# Patient Record
Sex: MALE | Race: WHITE | NOT HISPANIC OR LATINO | Employment: FULL TIME | URBAN - METROPOLITAN AREA
[De-identification: names, ages, dates, MRNs, and addresses within clinical notes are randomized per-mention and may not be internally consistent; named-entity substitution may affect disease eponyms.]

---

## 2019-10-16 ENCOUNTER — OFFICE VISIT (OUTPATIENT)
Dept: URGENT CARE | Facility: CLINIC | Age: 44
End: 2019-10-16
Payer: COMMERCIAL

## 2019-10-16 VITALS
BODY MASS INDEX: 27 KG/M2 | TEMPERATURE: 98 F | DIASTOLIC BLOOD PRESSURE: 80 MMHG | OXYGEN SATURATION: 99 % | HEART RATE: 91 BPM | WEIGHT: 168 LBS | RESPIRATION RATE: 18 BRPM | SYSTOLIC BLOOD PRESSURE: 123 MMHG | HEIGHT: 66 IN

## 2019-10-16 DIAGNOSIS — M54.50 LOW BACK PAIN, NON-SPECIFIC: ICD-10-CM

## 2019-10-16 DIAGNOSIS — S40.021A CONTUSION OF RIGHT UPPER EXTREMITY, INITIAL ENCOUNTER: ICD-10-CM

## 2019-10-16 DIAGNOSIS — S20.229A CONTUSION OF BACK, UNSPECIFIED LATERALITY, INITIAL ENCOUNTER: ICD-10-CM

## 2019-10-16 DIAGNOSIS — M79.601 RIGHT ARM PAIN: Primary | ICD-10-CM

## 2019-10-16 PROCEDURE — 99203 OFFICE O/P NEW LOW 30 MIN: CPT | Mod: S$GLB,,, | Performed by: NURSE PRACTITIONER

## 2019-10-16 PROCEDURE — 72100 XR LUMBAR SPINE 2 OR 3 VIEWS: ICD-10-PCS | Mod: FY,S$GLB,, | Performed by: RADIOLOGY

## 2019-10-16 PROCEDURE — 73090 XR FOREARM RIGHT: ICD-10-PCS | Mod: FY,RT,S$GLB, | Performed by: RADIOLOGY

## 2019-10-16 PROCEDURE — 3008F PR BODY MASS INDEX (BMI) DOCUMENTED: ICD-10-PCS | Mod: CPTII,S$GLB,, | Performed by: NURSE PRACTITIONER

## 2019-10-16 PROCEDURE — 3008F BODY MASS INDEX DOCD: CPT | Mod: CPTII,S$GLB,, | Performed by: NURSE PRACTITIONER

## 2019-10-16 PROCEDURE — 72100 X-RAY EXAM L-S SPINE 2/3 VWS: CPT | Mod: FY,S$GLB,, | Performed by: RADIOLOGY

## 2019-10-16 PROCEDURE — 73090 X-RAY EXAM OF FOREARM: CPT | Mod: FY,RT,S$GLB, | Performed by: RADIOLOGY

## 2019-10-16 PROCEDURE — 99203 PR OFFICE/OUTPT VISIT, NEW, LEVL III, 30-44 MIN: ICD-10-PCS | Mod: S$GLB,,, | Performed by: NURSE PRACTITIONER

## 2019-10-16 RX ORDER — MUPIROCIN 20 MG/G
OINTMENT TOPICAL
Status: COMPLETED | OUTPATIENT
Start: 2019-10-16 | End: 2019-10-16

## 2019-10-16 RX ORDER — LAMOTRIGINE 25 MG/1
25 TABLET ORAL DAILY
COMMUNITY
End: 2019-10-18 | Stop reason: SDUPTHER

## 2019-10-16 RX ADMIN — MUPIROCIN: 20 OINTMENT TOPICAL at 12:10

## 2019-10-16 NOTE — PATIENT INSTRUCTIONS
RETURN TO CLINIC IF SYMPTOMS WORSEN OR CALL 911 IMMEDIATELY FOR SHORTNESS OF BREATH, CHEST PAIN, DIZZINESS, WORSENING PAIN, NAUSEA AND VOMITING, HEART PALPITATIONS, FEVER AND/OR NECK STIFFNESS. FOLLOW UP WITH PRIMARY CARE PROVIDER IN THE AM.    If you were prescribed a narcotic or controlled medication, do not drive or operate heavy equipment or machinery while taking these medications.  You must understand that you've received an Urgent Care treatment only and that you may be released before all your medical problems are known or treated. You, the patient, will arrange for follow up care as instructed.  Follow up with your PCP or specialty clinic as directed in the next 1-2 weeks if not improved or as needed.  You can call [phone] to schedule an appointment with the appropriate provider.  If your condition worsens we recommend that you receive another evaluation at the emergency room immediately or contact your primary medical clinics after hours call service to discuss your concerns.  Please return here or go to the Emergency Department for any concerns or worsening of condition.

## 2019-10-16 NOTE — PROGRESS NOTES
"Subjective:       Patient ID: Pal Mccrary is a 44 y.o. male.    Vitals:  height is 5' 6" (1.676 m) and weight is 76.2 kg (168 lb). His temperature is 98.4 °F (36.9 °C). His blood pressure is 123/80 and his pulse is 91. His respiration is 18 and oxygen saturation is 99%.     Chief Complaint: Fall (last night)    Patient presents with complaint of fall that happened last night. He notes he was going down the stairs and he fell on his right side. He states he has pain from the right side of his neck to his fingers, his right ankle, and his lower back. He denies takign any pain medications.  No head trauma.  No loss of consciousness.  No nausea, vomiting, or headache.    Fall   The accident occurred 12 to 24 hours ago. Fall occurred: stairs. He fell from a height of 1 to 2 ft. He landed on hard floor. The point of impact was the buttocks. The pain is present in the back, right shoulder, right upper arm, right lower arm, right wrist, right hand and right foot. The pain is at a severity of 7/10. The pain is moderate. The symptoms are aggravated by ambulation and movement. Pertinent negatives include no fever, headaches, nausea or vomiting. He has tried nothing for the symptoms. The treatment provided no relief.       Constitution: Negative for chills, fatigue and fever.   HENT: Negative for congestion and sore throat.    Neck: Negative for painful lymph nodes.   Cardiovascular: Negative for chest pain and leg swelling.   Eyes: Negative for double vision and blurred vision.   Respiratory: Negative for cough and shortness of breath.    Gastrointestinal: Negative for nausea, vomiting and diarrhea.   Genitourinary: Negative for dysuria, frequency and urgency.   Musculoskeletal: Positive for pain, back pain and pain with walking. Negative for joint pain, joint swelling, muscle cramps and muscle ache.        Right side of neck down to hand, right ankle, and lower back    Skin: Negative for color change, pale and rash. "   Allergic/Immunologic: Negative for seasonal allergies.   Neurological: Negative for dizziness, history of vertigo, light-headedness, passing out and headaches.   Hematologic/Lymphatic: Negative for swollen lymph nodes, easy bruising/bleeding and history of blood clots. Does not bruise/bleed easily.   Psychiatric/Behavioral: Negative for nervous/anxious, sleep disturbance and depression. The patient is not nervous/anxious.        Objective:      Physical Exam   Constitutional: He is oriented to person, place, and time. He appears well-developed and well-nourished. He is cooperative.  Non-toxic appearance. He does not appear ill. No distress.   HENT:   Head: Normocephalic and atraumatic. Head is without abrasion, without contusion and without laceration.   Right Ear: Hearing, tympanic membrane, external ear and ear canal normal. No hemotympanum.   Left Ear: Hearing, tympanic membrane, external ear and ear canal normal. No hemotympanum.   Nose: Nose normal. No mucosal edema, rhinorrhea or nasal deformity. No epistaxis. Right sinus exhibits no maxillary sinus tenderness and no frontal sinus tenderness. Left sinus exhibits no maxillary sinus tenderness and no frontal sinus tenderness.   Mouth/Throat: Uvula is midline, oropharynx is clear and moist and mucous membranes are normal. No trismus in the jaw. Normal dentition. No uvula swelling. No posterior oropharyngeal erythema.   Eyes: Pupils are equal, round, and reactive to light. Conjunctivae, EOM and lids are normal. Right eye exhibits no discharge. Left eye exhibits no discharge. No scleral icterus.   Neck: Trachea normal, normal range of motion, full passive range of motion without pain and phonation normal. Neck supple. No spinous process tenderness and no muscular tenderness present. No neck rigidity. No tracheal deviation present.   Cardiovascular: Normal rate, regular rhythm, normal heart sounds, intact distal pulses and normal pulses.   Pulmonary/Chest: Effort  normal and breath sounds normal. No respiratory distress.   Abdominal: Soft. Normal appearance and bowel sounds are normal. He exhibits no distension, no pulsatile midline mass and no mass. There is no tenderness.   Musculoskeletal: Normal range of motion. He exhibits no edema or deformity.        Lumbar back: He exhibits tenderness, bony tenderness, swelling, pain and spasm.        Back:         Right forearm: He exhibits tenderness.        Arms:  Neurological: He is alert and oriented to person, place, and time. He has normal strength. No cranial nerve deficit or sensory deficit. He exhibits normal muscle tone. He displays no seizure activity. Coordination normal. GCS eye subscore is 4. GCS verbal subscore is 5. GCS motor subscore is 6.   Skin: Skin is warm, dry, intact, not diaphoretic and not pale. Capillary refill takes less than 2 seconds. abrasion, burn, bruising and ecchymosis  Psychiatric: He has a normal mood and affect. His speech is normal and behavior is normal. Judgment and thought content normal. Cognition and memory are normal.   Nursing note and vitals reviewed.        Assessment:       1. Right arm pain    2. Low back pain, non-specific    3. Contusion of back, unspecified laterality, initial encounter    4. Contusion of right upper extremity, initial encounter        Plan:         Right arm pain  -     SLING FOR HOME USE    Low back pain, non-specific  -     X-Ray Lumbar Spine 2 Or 3 Views; Future; Expected date: 10/16/2019  -     X-Ray Forearm Right; Future; Expected date: 10/16/2019    Contusion of back, unspecified laterality, initial encounter    Contusion of right upper extremity, initial encounter    Other orders  -     mupirocin 2 % ointment

## 2019-10-16 NOTE — LETTER
October 16, 2019      [facility]  [address] [address]  Phone: [phone]  Fax: [phone]       Patient: Pal Mccrary   YOB: 1975  Date of Visit: 10/16/2019    To Whom It May Concern:    Yamile Mccrary  was at [facility] on 10/16/2019. He may return to work/school on 10/17/2019 with minimal restrictions. No heavy lifting or hard labor. Patient is to take pain medication when needed. If you have any questions or concerns, or if I can be of further assistance, please do not hesitate to contact me.      Sincerely,          Sofya Donis , VENTURAC

## 2019-10-18 ENCOUNTER — IMMUNIZATION (OUTPATIENT)
Dept: PHARMACY | Facility: CLINIC | Age: 44
End: 2019-10-18
Payer: COMMERCIAL

## 2019-10-18 ENCOUNTER — OFFICE VISIT (OUTPATIENT)
Dept: INTERNAL MEDICINE | Facility: CLINIC | Age: 44
End: 2019-10-18
Payer: COMMERCIAL

## 2019-10-18 ENCOUNTER — LAB VISIT (OUTPATIENT)
Dept: LAB | Facility: HOSPITAL | Age: 44
End: 2019-10-18
Payer: COMMERCIAL

## 2019-10-18 VITALS
DIASTOLIC BLOOD PRESSURE: 60 MMHG | HEIGHT: 66 IN | OXYGEN SATURATION: 99 % | HEART RATE: 76 BPM | BODY MASS INDEX: 28.6 KG/M2 | WEIGHT: 177.94 LBS | SYSTOLIC BLOOD PRESSURE: 120 MMHG

## 2019-10-18 DIAGNOSIS — Z00.00 HEALTHCARE MAINTENANCE: Primary | ICD-10-CM

## 2019-10-18 DIAGNOSIS — Z00.00 HEALTHCARE MAINTENANCE: ICD-10-CM

## 2019-10-18 DIAGNOSIS — G40.909 NONINTRACTABLE EPILEPSY WITHOUT STATUS EPILEPTICUS, UNSPECIFIED EPILEPSY TYPE: ICD-10-CM

## 2019-10-18 DIAGNOSIS — S39.012D STRAIN OF MUSCLE, FASCIA AND TENDON OF LOWER BACK, SUBSEQUENT ENCOUNTER: ICD-10-CM

## 2019-10-18 LAB
ALBUMIN SERPL BCP-MCNC: 4.2 G/DL (ref 3.5–5.2)
ALP SERPL-CCNC: 58 U/L (ref 55–135)
ALT SERPL W/O P-5'-P-CCNC: 31 U/L (ref 10–44)
ANION GAP SERPL CALC-SCNC: 9 MMOL/L (ref 8–16)
AST SERPL-CCNC: 18 U/L (ref 10–40)
BASOPHILS # BLD AUTO: 0.02 K/UL (ref 0–0.2)
BASOPHILS NFR BLD: 0.3 % (ref 0–1.9)
BILIRUB SERPL-MCNC: 0.3 MG/DL (ref 0.1–1)
BUN SERPL-MCNC: 10 MG/DL (ref 6–20)
CALCIUM SERPL-MCNC: 8.7 MG/DL (ref 8.7–10.5)
CHLORIDE SERPL-SCNC: 106 MMOL/L (ref 95–110)
CHOLEST SERPL-MCNC: 207 MG/DL (ref 120–199)
CHOLEST/HDLC SERPL: 4.1 {RATIO} (ref 2–5)
CO2 SERPL-SCNC: 25 MMOL/L (ref 23–29)
CREAT SERPL-MCNC: 0.8 MG/DL (ref 0.5–1.4)
DIFFERENTIAL METHOD: NORMAL
EOSINOPHIL # BLD AUTO: 0.2 K/UL (ref 0–0.5)
EOSINOPHIL NFR BLD: 3 % (ref 0–8)
ERYTHROCYTE [DISTWIDTH] IN BLOOD BY AUTOMATED COUNT: 13.1 % (ref 11.5–14.5)
EST. GFR  (AFRICAN AMERICAN): >60 ML/MIN/1.73 M^2
EST. GFR  (NON AFRICAN AMERICAN): >60 ML/MIN/1.73 M^2
ESTIMATED AVG GLUCOSE: 108 MG/DL (ref 68–131)
GLUCOSE SERPL-MCNC: 100 MG/DL (ref 70–110)
HBA1C MFR BLD HPLC: 5.4 % (ref 4–5.6)
HCT VFR BLD AUTO: 44.8 % (ref 40–54)
HDLC SERPL-MCNC: 50 MG/DL (ref 40–75)
HDLC SERPL: 24.2 % (ref 20–50)
HGB BLD-MCNC: 14.9 G/DL (ref 14–18)
LDLC SERPL CALC-MCNC: 135.4 MG/DL (ref 63–159)
LYMPHOCYTES # BLD AUTO: 2 K/UL (ref 1–4.8)
LYMPHOCYTES NFR BLD: 30.2 % (ref 18–48)
MCH RBC QN AUTO: 29.9 PG (ref 27–31)
MCHC RBC AUTO-ENTMCNC: 33.3 G/DL (ref 32–36)
MCV RBC AUTO: 90 FL (ref 82–98)
MONOCYTES # BLD AUTO: 0.5 K/UL (ref 0.3–1)
MONOCYTES NFR BLD: 8.2 % (ref 4–15)
NEUTROPHILS # BLD AUTO: 3.8 K/UL (ref 1.8–7.7)
NEUTROPHILS NFR BLD: 58.3 % (ref 38–73)
NONHDLC SERPL-MCNC: 157 MG/DL
PLATELET # BLD AUTO: 229 K/UL (ref 150–350)
PMV BLD AUTO: 10.3 FL (ref 9.2–12.9)
POTASSIUM SERPL-SCNC: 4.2 MMOL/L (ref 3.5–5.1)
PROT SERPL-MCNC: 6.7 G/DL (ref 6–8.4)
RBC # BLD AUTO: 4.98 M/UL (ref 4.6–6.2)
SODIUM SERPL-SCNC: 140 MMOL/L (ref 136–145)
TRIGL SERPL-MCNC: 108 MG/DL (ref 30–150)
TSH SERPL DL<=0.005 MIU/L-ACNC: 1.39 UIU/ML (ref 0.4–4)
WBC # BLD AUTO: 6.59 K/UL (ref 3.9–12.7)

## 2019-10-18 PROCEDURE — 80053 COMPREHEN METABOLIC PANEL: CPT

## 2019-10-18 PROCEDURE — 36415 COLL VENOUS BLD VENIPUNCTURE: CPT

## 2019-10-18 PROCEDURE — 99396 PR PREVENTIVE VISIT,EST,40-64: ICD-10-PCS | Mod: S$GLB,,, | Performed by: STUDENT IN AN ORGANIZED HEALTH CARE EDUCATION/TRAINING PROGRAM

## 2019-10-18 PROCEDURE — 85025 COMPLETE CBC W/AUTO DIFF WBC: CPT

## 2019-10-18 PROCEDURE — 99999 PR PBB SHADOW E&M-EST. PATIENT-LVL III: CPT | Mod: PBBFAC,,, | Performed by: STUDENT IN AN ORGANIZED HEALTH CARE EDUCATION/TRAINING PROGRAM

## 2019-10-18 PROCEDURE — 80061 LIPID PANEL: CPT

## 2019-10-18 PROCEDURE — 99999 PR PBB SHADOW E&M-EST. PATIENT-LVL III: ICD-10-PCS | Mod: PBBFAC,,, | Performed by: STUDENT IN AN ORGANIZED HEALTH CARE EDUCATION/TRAINING PROGRAM

## 2019-10-18 PROCEDURE — 83036 HEMOGLOBIN GLYCOSYLATED A1C: CPT

## 2019-10-18 PROCEDURE — 99396 PREV VISIT EST AGE 40-64: CPT | Mod: S$GLB,,, | Performed by: STUDENT IN AN ORGANIZED HEALTH CARE EDUCATION/TRAINING PROGRAM

## 2019-10-18 PROCEDURE — 84443 ASSAY THYROID STIM HORMONE: CPT

## 2019-10-18 RX ORDER — LAMOTRIGINE 25 MG/1
25 TABLET ORAL DAILY
Qty: 30 TABLET | Refills: 3 | Status: SHIPPED | OUTPATIENT
Start: 2019-10-18 | End: 2019-11-05

## 2019-10-18 NOTE — PROGRESS NOTES
Reviewed patient's medical record in Epic. Patient's history and physical reviewed and discussed, please refer to resident physician's note for specific details. I agree with resident's assessment and plan.    45 y/o man with h/o epilepsy with no other PMH here for health maintenance / establish care visit following recent  visit for fall. Epilepsy well-controlled, continuing current medication until he can get in to see Neurology.  Healing well after fall with back muscle strain, contusion to arm causing ulnar neuropathy which is improving consistently.    rFench Hess MD

## 2019-10-18 NOTE — PROGRESS NOTES
INTERNAL MEDICINE RESIDENT CLINIC                                                             CLINIC NOTE    Patient Name: Pal Mccrary  YOB: 1975    PRESENTING HISTORY     Chief Complaint   Patient presents with    Establish Care       History of Present Illness:  Mr. Pal Mccrary is a 44 y.o. male w/ significant PMHx of epilepsy.    Pt presents to resident clinic for follow-up of his urgent care visit, review of epilepsy and healthcare maintenance.     Two days ago, he visited urgent care following a fall. He was walking down a tight metal spiral staircase that was outdoors with his dog on a leash. It had rained the night before so the steps were damp. His dog tugged at the leash in awkwardly and thus caused him to become off-balance. He denies lightheadedness, loss of consciousness, pre-syncope or altered sensorium prior to the fall. Most of the trauma from the fall affected the right side of his back and right arm. Following the fall, he experienced bruising along his right arm and back as well as swelling around his right arm. Right after the fall, he also experienced tingling and numbness in his right pinky and ring fingers. This numbness and tingling has improved with the swelling. He has only required tylenol twice now for pain and feels the pain is managed without OTC analgesics at this point.     Epilepsy  Diagnosed two years ago. Well-controlled on his current regimen. Would like to establish care with a neurologist.   Lamotrigine, 25 mg qd    Healthcare maintenance    Tobacco: 36 pack year history. Quit cigarettes two years ago. Switched to E-cigarettes.   EtOH: Four beers every Sunday  Recreational: Last used cannabis two years ago. Stopped after his diagnosis of epilepsy.  Sexually active with wife. No birth control currently.      Review of Systems   Constitutional: Negative for chills, fever, malaise/fatigue and weight loss.   HENT: Negative for  congestion, ear discharge and sore throat.    Eyes: Negative for blurred vision and redness.   Respiratory: Negative for cough, shortness of breath and wheezing.    Cardiovascular: Negative for chest pain, palpitations and leg swelling.   Gastrointestinal: Negative for abdominal pain, constipation, diarrhea, nausea and vomiting.   Genitourinary: Negative for dysuria and frequency.   Musculoskeletal: Positive for myalgias. Negative for joint pain.   Skin: Negative for itching and rash.   Neurological: Negative for dizziness, seizures, loss of consciousness and headaches.   Endo/Heme/Allergies: Negative for environmental allergies. Does not bruise/bleed easily.   Psychiatric/Behavioral: Negative for depression. The patient is not nervous/anxious and does not have insomnia.          PAST HISTORY:     Past Medical History:   Diagnosis Date    Seizures        No past surgical history on file.    No family history on file.    Social History     Socioeconomic History    Marital status:      Spouse name: Not on file    Number of children: Not on file    Years of education: Not on file    Highest education level: Not on file   Occupational History    Not on file   Social Needs    Financial resource strain: Not on file    Food insecurity:     Worry: Not on file     Inability: Not on file    Transportation needs:     Medical: Not on file     Non-medical: Not on file   Tobacco Use    Smoking status: Former Smoker    Smokeless tobacco: Current User   Substance and Sexual Activity    Alcohol use: Yes    Drug use: Never    Sexual activity: Not on file   Lifestyle    Physical activity:     Days per week: Not on file     Minutes per session: Not on file    Stress: Not on file   Relationships    Social connections:     Talks on phone: Not on file     Gets together: Not on file     Attends Quaker service: Not on file     Active member of club or organization: Not on file     Attends meetings of clubs or  organizations: Not on file     Relationship status: Not on file   Other Topics Concern    Not on file   Social History Narrative    Not on file       MEDICATIONS & ALLERGIES:               Medication List with Changes/Refills   Current Medications    LAMOTRIGINE (LAMICTAL) 25 MG TABLET    Take 25 mg by mouth once daily.       Review of patient's allergies indicates:   Allergen Reactions    Ansaid [flurbiprofen]     Shellfish containing products        OBJECTIVE:     Vital Signs:  There were no vitals filed for this visit.  Wt Readings from Last 5 Encounters:   10/16/19 76.2 kg (168 lb)       No results found for this or any previous visit (from the past 24 hour(s)).      Physical Exam      ASSESSMENT & PLAN:     Pal was seen today for establish care. Advised him to include ice packs as part of the regimen for his fall.      Diagnoses and all orders for this visit:    Healthcare maintenance  -     Lipid panel; Future  -     Hemoglobin A1c; Future    Nonintractable epilepsy without status epilepticus, unspecified epilepsy type  -     Ambulatory Referral to Neurology  -     lamoTRIgine (LAMICTAL) 25 MG tablet; Take 1 tablet (25 mg total) by mouth once daily.  -     Comprehensive metabolic panel; Future  -     CBC auto differential; Future  -     TSH; Future    Strain of muscle, fascia and tendon of lower back, subsequent encounter        AT NEXT VISIT  Review visit with neurology.    RTC in 6 months. Pt was instructed to contact the clinic if symptoms worsened or if new symptoms arise.    I have discussed the plan with Dr Hess.    Behram Khan, MD  Internal Medicine PGY-3  M: [phone]   #[phone]

## 2019-10-18 NOTE — LETTER
October 18, 2019      [facility]  [address]  [facility] [address]  Phone: [phone]  Fax: [phone]       Patient: Pal Mccrary   YOB: 1975  Date of Visit: 10/18/2019    To Whom It May Concern:    Yamile Mccrary  was at [facility] on 10/18/2019. He may return to work/school on Monday, 10/21 with no restrictions. If you have any questions or concerns, or if I can be of further assistance, please do not hesitate to contact me.    Sincerely,    Behram Khan, MD

## 2019-10-29 ENCOUNTER — PATIENT MESSAGE (OUTPATIENT)
Dept: INTERNAL MEDICINE | Facility: CLINIC | Age: 44
End: 2019-10-29

## 2019-10-29 ENCOUNTER — TELEPHONE (OUTPATIENT)
Dept: INTERNAL MEDICINE | Facility: CLINIC | Age: 44
End: 2019-10-29

## 2019-10-29 NOTE — TELEPHONE ENCOUNTER
----- Message from Josette Benavides sent at 10/29/2019  8:51 AM CDT -----  Contact: self   Patient would like to get test results  Name of test (lab, mammo, etc.):  NON FASTING LAB    Date of test:  10/18  Ordering provider: KHAN, BEHRAM  Where was the test performed:  [facility] INTERNAL MED  Would the patient rather a call back or a response via [facility]?:  Call back  Comments:

## 2019-11-04 ENCOUNTER — PATIENT MESSAGE (OUTPATIENT)
Dept: INTERNAL MEDICINE | Facility: CLINIC | Age: 44
End: 2019-11-04

## 2019-11-05 ENCOUNTER — PATIENT MESSAGE (OUTPATIENT)
Dept: INTERNAL MEDICINE | Facility: CLINIC | Age: 44
End: 2019-11-05

## 2019-11-05 DIAGNOSIS — G40.909 NONINTRACTABLE EPILEPSY WITHOUT STATUS EPILEPTICUS, UNSPECIFIED EPILEPSY TYPE: ICD-10-CM

## 2019-11-05 DIAGNOSIS — G40.909 NONINTRACTABLE EPILEPSY WITHOUT STATUS EPILEPTICUS, UNSPECIFIED EPILEPSY TYPE: Primary | ICD-10-CM

## 2019-11-05 RX ORDER — LAMOTRIGINE 100 MG/1
300 TABLET ORAL DAILY
Qty: 90 TABLET | Refills: 1 | Status: SHIPPED | OUTPATIENT
Start: 2019-11-05 | End: 2019-11-05 | Stop reason: SDUPTHER

## 2019-11-05 RX ORDER — LAMOTRIGINE 100 MG/1
300 TABLET ORAL DAILY
Qty: 90 TABLET | Refills: 1 | Status: SHIPPED | OUTPATIENT
Start: 2019-11-05 | End: 2019-11-14 | Stop reason: SDUPTHER

## 2019-11-05 NOTE — TELEPHONE ENCOUNTER
Patient message from 10/29/19 notes that his current dose is 100mg x 3 (300mg total) taken once daily, which is a more usual dose for management of seizures. Needs bridge prescription to cover until neurology appt 12/12.

## 2019-11-13 ENCOUNTER — PATIENT MESSAGE (OUTPATIENT)
Dept: INTERNAL MEDICINE | Facility: CLINIC | Age: 44
End: 2019-11-13

## 2019-11-13 DIAGNOSIS — G40.909 NONINTRACTABLE EPILEPSY WITHOUT STATUS EPILEPTICUS, UNSPECIFIED EPILEPSY TYPE: ICD-10-CM

## 2019-11-18 RX ORDER — LAMOTRIGINE 100 MG/1
300 TABLET ORAL DAILY
Qty: 90 TABLET | Refills: 1 | Status: SHIPPED | OUTPATIENT
Start: 2019-11-18 | End: 2019-12-12 | Stop reason: SDUPTHER

## 2019-12-12 ENCOUNTER — OFFICE VISIT (OUTPATIENT)
Dept: NEUROLOGY | Facility: CLINIC | Age: 44
End: 2019-12-12
Payer: COMMERCIAL

## 2019-12-12 VITALS
SYSTOLIC BLOOD PRESSURE: 135 MMHG | HEIGHT: 66 IN | BODY MASS INDEX: 28.72 KG/M2 | DIASTOLIC BLOOD PRESSURE: 93 MMHG | HEART RATE: 84 BPM

## 2019-12-12 DIAGNOSIS — R56.9 SEIZURE: ICD-10-CM

## 2019-12-12 DIAGNOSIS — G40.209 COMPLEX PARTIAL SEIZURES EVOLVING TO GENERALIZED TONIC-CLONIC SEIZURES: Primary | ICD-10-CM

## 2019-12-12 PROCEDURE — 99205 PR OFFICE/OUTPT VISIT, NEW, LEVL V, 60-74 MIN: ICD-10-PCS | Mod: S$GLB,,, | Performed by: PSYCHIATRY & NEUROLOGY

## 2019-12-12 PROCEDURE — 99999 PR PBB SHADOW E&M-EST. PATIENT-LVL II: ICD-10-PCS | Mod: PBBFAC,,, | Performed by: PSYCHIATRY & NEUROLOGY

## 2019-12-12 PROCEDURE — 99205 OFFICE O/P NEW HI 60 MIN: CPT | Mod: S$GLB,,, | Performed by: PSYCHIATRY & NEUROLOGY

## 2019-12-12 PROCEDURE — 3008F BODY MASS INDEX DOCD: CPT | Mod: CPTII,S$GLB,, | Performed by: PSYCHIATRY & NEUROLOGY

## 2019-12-12 PROCEDURE — 3008F PR BODY MASS INDEX (BMI) DOCUMENTED: ICD-10-PCS | Mod: CPTII,S$GLB,, | Performed by: PSYCHIATRY & NEUROLOGY

## 2019-12-12 PROCEDURE — 99999 PR PBB SHADOW E&M-EST. PATIENT-LVL II: CPT | Mod: PBBFAC,,, | Performed by: PSYCHIATRY & NEUROLOGY

## 2019-12-12 RX ORDER — LAMOTRIGINE 100 MG/1
300 TABLET ORAL DAILY
Qty: 90 TABLET | Refills: 3 | Status: SHIPPED | OUTPATIENT
Start: 2019-12-12 | End: 2020-04-22 | Stop reason: SDUPTHER

## 2019-12-12 NOTE — PROGRESS NOTES
EPILEPSY CLINIC:   INITIAL VISIT    Name: Pal Mccrary  MRN:[id number]   CSN: [id number]  Date of service: 12/12/2019    Age:44 y.o.   Gender:male     Referring Physician/Service: Behram Khan, MD  [address] [place] [address]   The patient is here today alone  History obtained from patient    CHIEF COMPLAINT:   - evaluation and management of seizures    PRESENT ILLNESS:    This is a 44 y.o. right handed male who presents for evaluation and management of seizures.    - onset of seizures x infancy  - patient only recalls them from ~12-13 years of age: occurring 1-2 yearly    Younger years: loss of time and soreness of body, waking up on the floor (prior recollection was of laying in bed and watching tv); no hx of tongue bite of b/b incontinence  Has hx of associated injuries (bruises etc)    - not diagnosed with seizures until age ~32years  - was with friends, has no recollection of the event. But reportedly had a GTC sz and was seen by FP and Neurologist subsequently and then diagnosed with sz.  - had a few sz during that period and was evaluated and started on AED.    No aura reported during younger age, able to experience an 'electric/battery/ozone' smell/taste after age 32 years.   AED changed from LEV to LTG due to behavioral side-effects ~ few years ago.  - last sz was ~ 1 year ago: 'worst' per pt - was reportedly standing and talking when he just dropped to the ground (felt himself 'fading'); was confused for a prolonged (>6 hours) period of time when he came around.    - not good AED compliance      - also reports occasional/regular 'random twitches' - all his life; last episode was ~ 8 months ago    Any other relevant information:  - per patient his wife feels that while he was smoking marijuana seizures were better controlled    PREVIOUS EVALUATIONS:  No records available  Previous EEGs:   - last EEG done ~ 2 years ago, FORTUNATO Ugalde: normal (per patient)  - has had > 1 abnormal EEGs in the  past     Previous MRIs:   - last MRI was done ~4-5 years ago; FORTUNATO Ugalde: abnormal focus for FLE (per patient)    Additional tests:  1)CT Scan: no  2) EEG\Video Monitoring: no  3) PET Scan: no  4) Neuropsychological evaluation: no  5) DEXA Scan: no   6) Others: no    RISK FACTORS FOR SEIZURES:    1. Head Trauma:  Yes - falls from extreme sports as a child; hx of LOC but no hx of # skull    2. CNS Infections:  No  3. CNS Tumors: No     4. CNS Vascular Disease: No     5. Febrile Seizures: ??possibly - has hx of sz as an infant   6. Developmental Delay: No        7. Family History of Seizures: Yes  - paternal great GM, sister (febrile sz)   8. Birth history: oldest of 3 siblings; FTNVD    Pregnancy/Labor/Delivery: n/a    CURRENT MEDICATIONS:   Current Outpatient Medications   Medication Sig Dispense Refill    lamoTRIgine (LAMICTAL) 100 MG tablet Take 3 tablets (300 mg total) by mouth once daily. To prevent seizure 90 tablet 1     No current facility-administered medications for this visit.       Folic acid: no    CURRENT ANTI EPILEPTIC MEDICATIONS:   - LTG 300mg q day    VAGAL NERVE STIMULATOR: n/a    PRIOR ANTICONVULSANT HISTORY:   - tried LEV - behavioral issues     PAST MEDICAL HISTORY:   Active Ambulatory Problems     Diagnosis Date Noted    Nonintractable epilepsy without status epilepticus 10/18/2019     Resolved Ambulatory Problems     Diagnosis Date Noted    No Resolved Ambulatory Problems     Past Medical History:   Diagnosis Date    Epilepsy     Seizures       PAST PSYCHIATRIC HISTORY:  Longstanding anxiety and MDD - no tx; no hx of SI or HI    PAST SURGICAL HISTORY including Epilepsy surgery: No past surgical history on file.     FAMILY HISTORY:   Family History   Problem Relation Age of Onset    Heart disease Father        SOCIAL HISTORY:   Social History     Socioeconomic History    Marital status:      Spouse name: Not on file    Number of children: Not on file    Years of education:  Not on file    Highest education level: Not on file   Occupational History    Not on file   Social Needs    Financial resource strain: Not on file    Food insecurity:     Worry: Not on file     Inability: Not on file    Transportation needs:     Medical: Not on file     Non-medical: Not on file   Tobacco Use    Smoking status: Former Smoker     Packs/day: 1.50     Years: 24.00     Pack years: 36.00     Types: Vaping with nicotine    Smokeless tobacco: Current User   Substance and Sexual Activity    Alcohol use: Yes     Alcohol/week: 4.0 standard drinks     Types: 4 Cans of beer per week    Drug use: Never    Sexual activity: Yes     Partners: Female     Birth control/protection: None   Lifestyle    Physical activity:     Days per week: Not on file     Minutes per session: Not on file    Stress: Not on file   Relationships    Social connections:     Talks on phone: Not on file     Gets together: Not on file     Attends Episcopalian service: Not on file     Active member of club or organization: Not on file     Attends meetings of clubs or organizations: Not on file     Relationship status: Not on file   Other Topics Concern    Not on file   Social History Narrative    Not on file      a) Marital status:                                                     b) Living situation: patient lives with wife  c) Employed/Unemployed/Other: Employed full time - maintains apartments    DRIVING HISTORY:  Currently driving: Yes      LEVEL OF EDUCATION: some college    SUBSTANCE USE: occasional use of etoh - weekends, beers, gin/tonic; quit smoking ~ 3 years ago; occasional e-cig use; occasional use of marijuana    ALLERGIES: Ansaid [flurbiprofen] and Shellfish containing products     REVIEW OF SYSTEMS:  Review of Systems    GENERAL EXAMINATION:  There were no vitals taken for this visit.     General Appearance:    This is an average built male who appears well.  HEENT: There are no dysmorphic features  Skin:  There are no obvious stigmata for neurocutaneous disorders.  Neck: not assessed  Cardiovascular: not assessed  Lungs: not assessed  Abdomen: deferred  Spine: not assessed  Extremities: not assessed    NEUROLOGICAL EXAMINATION:  Mental status: Alert and oriented x 4; pleasant and cooperative with exam  Memory: Recent memory intact, Remote memory intact, Age correct, Month correct  Attention and concentration: intact  Fund of knowledge: adequate  Speech: normal  Dysarthria: No   Eyes: PERRL; EOM intact; No nystagmus. The visual pursuits  were smooth with normal saccadic eye movements.   Fundoscopic Exam: not assessed  No facial asymmetry. Facial sensation bilaterally not assessed  Hearing was intact bilaterally   Tongue and palate; SCM and trapezii bilaterally: not assessed     Motor examination:  Normal bulk and tone bilaterally. Power: no obvious deficits  Abnormal movements: none  Deep tendon reflexes: not assessed  Dysmetria: not assessed    Sensory examination:   - not assessed    Gait:  Normal gait and station    IMPRESSION:  The patient's history is consistent with:  Seizure  43yo M with hx of seizures possibly since infancy; diagnosed x age 12-13 yrs  - per patient has prior tests (EEG, MRI) suggestive of FLE (no records available)    Current AEDs: LTG 300mg q day - not very compliant      Plan:  - obtain prior records  - continue LTG 300mg q day; check levels  - MRI Brain  - Ext EEG > 1 hour    Seizure log  Seizure/fall precautions    Plan of care was discussed in detail with patient.     The patient was asked to call me/the clinic 1 week after the test(s) are done to obtain results.    More than 50% of the 60 minutes spent with the patient (as well as family/caregiver(s) was spent on face-to-face counseling about:    1. Diagnosis, plans, prognosis, medications and possible side-effects, risks and benefits of treatment, other alternatives to AEDs.  2. Risks related to continued seizures, status epilepticus,  SUDEP, driving restrictions and seizure precautions ( no baths but showers are ok, no swimming unsupervised, no use of heavy machinery, no use of sharp moving objects, avoid heights).   3. Issues related to pregnancy, OCP and breast feeding as it relates to epilepsy.  4. The potential of teratogenicity and suicidal risks of anti-epileptic medications.  5.Avoid any activity that compromise patient safety related to seizures.     Questions and concerns raised by the patient and family/care-giver(s) were addressed and they indicated understanding of everything discussed and agreed to plans as above.    Return after tests are done or earlier prn    Jannette Oconnell MD, LUNA(), FACNS, MARIBEL.  Neurology-Epilepsy.  [facility].

## 2019-12-12 NOTE — LETTER
December 15, 2019      Behram Khan, MD  [address]  [facility] [address]           [facility] - Neurology Epilepsy  [address], [address]  [facility] [address]  Phone: [phone]  Fax: [phone]          Patient: Pal Mccrary   MR Number: [id number]   YOB: 1975   Date of Visit: 12/12/2019       Dear Dr. Behram Khan:    Thank you for referring Pal Mccrary to me for evaluation. Attached you will find relevant portions of my assessment and plan of care.    If you have questions, please do not hesitate to call me. I look forward to following Pal Mccrary along with you.    Sincerely,    Jannette Oconnell MD    Enclosure  CC:  No Recipients    If you would like to receive this communication electronically, please contact [email or web address] or [phone] to request more information on [employer] Link access.    For providers and/or their staff who would like to refer a patient to [facility], please contact us through our one-stop-shop provider referral line, [facility], at [phone].    If you feel you have received this communication in error or would no longer like to receive these types of communications, please e-mail [email or web address]

## 2019-12-15 PROBLEM — R56.9 SEIZURE: Status: ACTIVE | Noted: 2019-12-15

## 2019-12-16 NOTE — ASSESSMENT & PLAN NOTE
45yo M with hx of seizures possibly since infancy; diagnosed x age 12-13 yrs  - per patient has prior tests (EEG, MRI) suggestive of FLE (no records available)    Current AEDs: LTG 300mg q day - not very compliant      Plan:  - obtain prior records  - continue LTG 300mg q day; check levels  - MRI Brain  - Ext EEG > 1 hour    Seizure log  Seizure/fall precautions    Plan of care was discussed in detail with patient.

## 2019-12-20 ENCOUNTER — PATIENT MESSAGE (OUTPATIENT)
Dept: NEUROLOGY | Facility: CLINIC | Age: 44
End: 2019-12-20

## 2020-04-22 ENCOUNTER — PATIENT MESSAGE (OUTPATIENT)
Dept: NEUROLOGY | Facility: CLINIC | Age: 45
End: 2020-04-22

## 2020-04-22 DIAGNOSIS — G40.209 COMPLEX PARTIAL SEIZURES EVOLVING TO GENERALIZED TONIC-CLONIC SEIZURES: ICD-10-CM

## 2020-04-22 RX ORDER — LAMOTRIGINE 100 MG/1
300 TABLET ORAL DAILY
Qty: 90 TABLET | Refills: 1 | Status: SHIPPED | OUTPATIENT
Start: 2020-04-22 | End: 2020-08-22 | Stop reason: SDUPTHER

## 2020-08-22 DIAGNOSIS — G40.209 COMPLEX PARTIAL SEIZURES EVOLVING TO GENERALIZED TONIC-CLONIC SEIZURES: ICD-10-CM

## 2020-08-24 RX ORDER — LAMOTRIGINE 100 MG/1
300 TABLET ORAL DAILY
Qty: 90 TABLET | Refills: 1 | Status: SHIPPED | OUTPATIENT
Start: 2020-08-24 | End: 2020-12-28 | Stop reason: SDUPTHER

## 2021-03-09 ENCOUNTER — LAB VISIT (OUTPATIENT)
Dept: INTERNAL MEDICINE | Facility: CLINIC | Age: 46
End: 2021-03-09
Payer: COMMERCIAL

## 2021-03-09 ENCOUNTER — TELEPHONE (OUTPATIENT)
Dept: INTERNAL MEDICINE | Facility: CLINIC | Age: 46
End: 2021-03-09

## 2021-03-09 ENCOUNTER — OFFICE VISIT (OUTPATIENT)
Dept: PULMONOLOGY | Facility: CLINIC | Age: 46
End: 2021-03-09
Payer: COMMERCIAL

## 2021-03-09 ENCOUNTER — PATIENT OUTREACH (OUTPATIENT)
Dept: ADMINISTRATIVE | Facility: OTHER | Age: 46
End: 2021-03-09

## 2021-03-09 ENCOUNTER — OFFICE VISIT (OUTPATIENT)
Dept: INTERNAL MEDICINE | Facility: CLINIC | Age: 46
End: 2021-03-09
Payer: COMMERCIAL

## 2021-03-09 ENCOUNTER — HOSPITAL ENCOUNTER (OUTPATIENT)
Dept: RADIOLOGY | Facility: HOSPITAL | Age: 46
Discharge: HOME | End: 2021-03-09
Attending: PHYSICIAN ASSISTANT
Payer: COMMERCIAL

## 2021-03-09 VITALS
WEIGHT: 156.75 LBS | HEIGHT: 66 IN | OXYGEN SATURATION: 99 % | BODY MASS INDEX: 25.19 KG/M2 | TEMPERATURE: 98 F | DIASTOLIC BLOOD PRESSURE: 64 MMHG | SYSTOLIC BLOOD PRESSURE: 102 MMHG | HEART RATE: 79 BPM

## 2021-03-09 VITALS
BODY MASS INDEX: 24.98 KG/M2 | DIASTOLIC BLOOD PRESSURE: 82 MMHG | SYSTOLIC BLOOD PRESSURE: 138 MMHG | HEIGHT: 66 IN | OXYGEN SATURATION: 97 % | HEART RATE: 101 BPM | WEIGHT: 155.44 LBS

## 2021-03-09 DIAGNOSIS — R04.2 HEMOPTYSIS: Primary | ICD-10-CM

## 2021-03-09 DIAGNOSIS — R04.2 HEMOPTYSIS: ICD-10-CM

## 2021-03-09 DIAGNOSIS — J20.9 ACUTE BRONCHITIS, UNSPECIFIED ORGANISM: ICD-10-CM

## 2021-03-09 DIAGNOSIS — R05.9 COUGH: Primary | ICD-10-CM

## 2021-03-09 DIAGNOSIS — R05.9 COUGH: ICD-10-CM

## 2021-03-09 DIAGNOSIS — G40.909 NONINTRACTABLE EPILEPSY WITHOUT STATUS EPILEPTICUS, UNSPECIFIED EPILEPSY TYPE: ICD-10-CM

## 2021-03-09 DIAGNOSIS — E87.5 HYPERKALEMIA: Primary | ICD-10-CM

## 2021-03-09 PROCEDURE — U0005 INFEC AGEN DETEC AMPLI PROBE: HCPCS | Performed by: PHYSICIAN ASSISTANT

## 2021-03-09 PROCEDURE — 99214 OFFICE O/P EST MOD 30 MIN: CPT | Mod: S$GLB,,, | Performed by: PHYSICIAN ASSISTANT

## 2021-03-09 PROCEDURE — 71046 X-RAY EXAM CHEST 2 VIEWS: CPT | Mod: 26,,, | Performed by: RADIOLOGY

## 2021-03-09 PROCEDURE — 99214 PR OFFICE/OUTPT VISIT, EST, LEVL IV, 30-39 MIN: ICD-10-PCS | Mod: S$GLB,,, | Performed by: PHYSICIAN ASSISTANT

## 2021-03-09 PROCEDURE — 99204 OFFICE O/P NEW MOD 45 MIN: CPT | Mod: S$GLB,,, | Performed by: INTERNAL MEDICINE

## 2021-03-09 PROCEDURE — 99999 PR PBB SHADOW E&M-EST. PATIENT-LVL III: ICD-10-PCS | Mod: PBBFAC,,, | Performed by: INTERNAL MEDICINE

## 2021-03-09 PROCEDURE — 1126F AMNT PAIN NOTED NONE PRSNT: CPT | Mod: S$GLB,,, | Performed by: INTERNAL MEDICINE

## 2021-03-09 PROCEDURE — 1126F PR PAIN SEVERITY QUANTIFIED, NO PAIN PRESENT: ICD-10-PCS | Mod: S$GLB,,, | Performed by: INTERNAL MEDICINE

## 2021-03-09 PROCEDURE — 99999 PR PBB SHADOW E&M-EST. PATIENT-LVL IV: ICD-10-PCS | Mod: PBBFAC,,, | Performed by: PHYSICIAN ASSISTANT

## 2021-03-09 PROCEDURE — 1126F PR PAIN SEVERITY QUANTIFIED, NO PAIN PRESENT: ICD-10-PCS | Mod: S$GLB,,, | Performed by: PHYSICIAN ASSISTANT

## 2021-03-09 PROCEDURE — 99204 PR OFFICE/OUTPT VISIT, NEW, LEVL IV, 45-59 MIN: ICD-10-PCS | Mod: S$GLB,,, | Performed by: INTERNAL MEDICINE

## 2021-03-09 PROCEDURE — U0003 INFECTIOUS AGENT DETECTION BY NUCLEIC ACID (DNA OR RNA); SEVERE ACUTE RESPIRATORY SYNDROME CORONAVIRUS 2 (SARS-COV-2) (CORONAVIRUS DISEASE [COVID-19]), AMPLIFIED PROBE TECHNIQUE, MAKING USE OF HIGH THROUGHPUT TECHNOLOGIES AS DESCRIBED BY CMS-2020-01-R: HCPCS | Performed by: PHYSICIAN ASSISTANT

## 2021-03-09 PROCEDURE — 1126F AMNT PAIN NOTED NONE PRSNT: CPT | Mod: S$GLB,,, | Performed by: PHYSICIAN ASSISTANT

## 2021-03-09 PROCEDURE — 71046 X-RAY EXAM CHEST 2 VIEWS: CPT | Mod: TC

## 2021-03-09 PROCEDURE — 3008F BODY MASS INDEX DOCD: CPT | Mod: CPTII,S$GLB,, | Performed by: INTERNAL MEDICINE

## 2021-03-09 PROCEDURE — 3008F PR BODY MASS INDEX (BMI) DOCUMENTED: ICD-10-PCS | Mod: CPTII,S$GLB,, | Performed by: PHYSICIAN ASSISTANT

## 2021-03-09 PROCEDURE — 71046 XR CHEST PA AND LATERAL: ICD-10-PCS | Mod: 26,,, | Performed by: RADIOLOGY

## 2021-03-09 PROCEDURE — 99999 PR PBB SHADOW E&M-EST. PATIENT-LVL III: CPT | Mod: PBBFAC,,, | Performed by: INTERNAL MEDICINE

## 2021-03-09 PROCEDURE — 99999 PR PBB SHADOW E&M-EST. PATIENT-LVL IV: CPT | Mod: PBBFAC,,, | Performed by: PHYSICIAN ASSISTANT

## 2021-03-09 PROCEDURE — 3008F PR BODY MASS INDEX (BMI) DOCUMENTED: ICD-10-PCS | Mod: CPTII,S$GLB,, | Performed by: INTERNAL MEDICINE

## 2021-03-09 PROCEDURE — 3008F BODY MASS INDEX DOCD: CPT | Mod: CPTII,S$GLB,, | Performed by: PHYSICIAN ASSISTANT

## 2021-03-09 RX ORDER — AZITHROMYCIN 250 MG/1
TABLET, FILM COATED ORAL
Qty: 6 TABLET | Refills: 0 | Status: SHIPPED | OUTPATIENT
Start: 2021-03-09 | End: 2021-03-14

## 2021-03-10 LAB — SARS-COV-2 RNA RESP QL NAA+PROBE: NOT DETECTED

## 2021-11-11 ENCOUNTER — OFFICE VISIT (OUTPATIENT)
Dept: URGENT CARE | Facility: CLINIC | Age: 46
End: 2021-11-11
Payer: COMMERCIAL

## 2021-11-11 VITALS
DIASTOLIC BLOOD PRESSURE: 75 MMHG | RESPIRATION RATE: 18 BRPM | HEIGHT: 66 IN | WEIGHT: 155 LBS | HEART RATE: 82 BPM | BODY MASS INDEX: 24.91 KG/M2 | SYSTOLIC BLOOD PRESSURE: 128 MMHG | OXYGEN SATURATION: 98 % | TEMPERATURE: 99 F

## 2021-11-11 DIAGNOSIS — S69.92XA HAND INJURY, LEFT, INITIAL ENCOUNTER: ICD-10-CM

## 2021-11-11 DIAGNOSIS — S60.222A CONTUSION OF LEFT HAND, INITIAL ENCOUNTER: Primary | ICD-10-CM

## 2021-11-11 PROCEDURE — 3008F PR BODY MASS INDEX (BMI) DOCUMENTED: ICD-10-PCS | Mod: CPTII,S$GLB,, | Performed by: PHYSICIAN ASSISTANT

## 2021-11-11 PROCEDURE — 3074F PR MOST RECENT SYSTOLIC BLOOD PRESSURE < 130 MM HG: ICD-10-PCS | Mod: CPTII,S$GLB,, | Performed by: PHYSICIAN ASSISTANT

## 2021-11-11 PROCEDURE — 3008F BODY MASS INDEX DOCD: CPT | Mod: CPTII,S$GLB,, | Performed by: PHYSICIAN ASSISTANT

## 2021-11-11 PROCEDURE — 73130 XR HAND COMPLETE 3 VIEW LEFT: ICD-10-PCS | Mod: FY,LT,S$GLB, | Performed by: RADIOLOGY

## 2021-11-11 PROCEDURE — 3074F SYST BP LT 130 MM HG: CPT | Mod: CPTII,S$GLB,, | Performed by: PHYSICIAN ASSISTANT

## 2021-11-11 PROCEDURE — 1159F PR MEDICATION LIST DOCUMENTED IN MEDICAL RECORD: ICD-10-PCS | Mod: CPTII,S$GLB,, | Performed by: PHYSICIAN ASSISTANT

## 2021-11-11 PROCEDURE — 3078F DIAST BP <80 MM HG: CPT | Mod: CPTII,S$GLB,, | Performed by: PHYSICIAN ASSISTANT

## 2021-11-11 PROCEDURE — 1159F MED LIST DOCD IN RCRD: CPT | Mod: CPTII,S$GLB,, | Performed by: PHYSICIAN ASSISTANT

## 2021-11-11 PROCEDURE — 99214 PR OFFICE/OUTPT VISIT, EST, LEVL IV, 30-39 MIN: ICD-10-PCS | Mod: S$GLB,,, | Performed by: PHYSICIAN ASSISTANT

## 2021-11-11 PROCEDURE — 73130 X-RAY EXAM OF HAND: CPT | Mod: FY,LT,S$GLB, | Performed by: RADIOLOGY

## 2021-11-11 PROCEDURE — 3078F PR MOST RECENT DIASTOLIC BLOOD PRESSURE < 80 MM HG: ICD-10-PCS | Mod: CPTII,S$GLB,, | Performed by: PHYSICIAN ASSISTANT

## 2021-11-11 PROCEDURE — 99214 OFFICE O/P EST MOD 30 MIN: CPT | Mod: S$GLB,,, | Performed by: PHYSICIAN ASSISTANT

## 2021-11-11 RX ORDER — ACETAMINOPHEN AND CODEINE PHOSPHATE 300; 30 MG/1; MG/1
TABLET ORAL
COMMUNITY
Start: 2021-05-30 | End: 2023-02-08

## 2021-11-11 RX ORDER — LACOSAMIDE 100 MG/1
100 TABLET ORAL
COMMUNITY
Start: 2021-10-12 | End: 2023-02-08

## 2021-11-11 RX ORDER — ZONISAMIDE 100 MG/1
100 CAPSULE ORAL 2 TIMES DAILY
COMMUNITY
Start: 2021-10-22 | End: 2023-04-17 | Stop reason: SDUPTHER

## 2021-12-08 ENCOUNTER — OFFICE VISIT (OUTPATIENT)
Dept: URGENT CARE | Facility: CLINIC | Age: 46
End: 2021-12-08
Payer: COMMERCIAL

## 2021-12-08 VITALS
DIASTOLIC BLOOD PRESSURE: 80 MMHG | HEIGHT: 66 IN | OXYGEN SATURATION: 98 % | WEIGHT: 164 LBS | BODY MASS INDEX: 26.36 KG/M2 | TEMPERATURE: 97 F | HEART RATE: 75 BPM | SYSTOLIC BLOOD PRESSURE: 133 MMHG

## 2021-12-08 DIAGNOSIS — K02.9 PAIN DUE TO DENTAL CARIES: Primary | ICD-10-CM

## 2021-12-08 DIAGNOSIS — K04.7 DENTAL INFECTION: ICD-10-CM

## 2021-12-08 PROCEDURE — 99214 OFFICE O/P EST MOD 30 MIN: CPT | Mod: S$GLB,,, | Performed by: PHYSICIAN ASSISTANT

## 2021-12-08 PROCEDURE — 99214 PR OFFICE/OUTPT VISIT, EST, LEVL IV, 30-39 MIN: ICD-10-PCS | Mod: S$GLB,,, | Performed by: PHYSICIAN ASSISTANT

## 2021-12-08 RX ORDER — AMOXICILLIN 500 MG/1
500 TABLET, FILM COATED ORAL EVERY 12 HOURS
Qty: 20 TABLET | Refills: 0 | Status: SHIPPED | OUTPATIENT
Start: 2021-12-08 | End: 2021-12-18

## 2023-02-08 ENCOUNTER — HOSPITAL ENCOUNTER (INPATIENT)
Facility: HOSPITAL | Age: 48
LOS: 2 days | Discharge: HOME | DRG: 101 | End: 2023-02-10
Attending: EMERGENCY MEDICINE | Admitting: STUDENT IN AN ORGANIZED HEALTH CARE EDUCATION/TRAINING PROGRAM
Payer: COMMERCIAL

## 2023-02-08 DIAGNOSIS — R07.9 CHEST PAIN: ICD-10-CM

## 2023-02-08 DIAGNOSIS — G40.909 SEIZURE DISORDER: Primary | ICD-10-CM

## 2023-02-08 DIAGNOSIS — G40.909 RECURRENT SEIZURES: ICD-10-CM

## 2023-02-08 DIAGNOSIS — G40.919 BREAKTHROUGH SEIZURE: ICD-10-CM

## 2023-02-08 PROBLEM — F41.9 ANXIETY: Status: ACTIVE | Noted: 2023-02-08

## 2023-02-08 LAB
ALBUMIN SERPL BCP-MCNC: 4.7 G/DL (ref 3.5–5.2)
ALP SERPL-CCNC: 89 U/L (ref 55–135)
ALT SERPL W/O P-5'-P-CCNC: 88 U/L (ref 10–44)
ANION GAP SERPL CALC-SCNC: 12 MMOL/L (ref 8–16)
AST SERPL-CCNC: 47 U/L (ref 10–40)
BASOPHILS # BLD AUTO: 0.05 K/UL (ref 0–0.2)
BASOPHILS NFR BLD: 0.7 % (ref 0–1.9)
BILIRUB SERPL-MCNC: 0.7 MG/DL (ref 0.1–1)
BILIRUB UR QL STRIP: NEGATIVE
BUN SERPL-MCNC: 11 MG/DL (ref 6–20)
CALCIUM SERPL-MCNC: 10 MG/DL (ref 8.7–10.5)
CHLORIDE SERPL-SCNC: 108 MMOL/L (ref 95–110)
CLARITY UR REFRACT.AUTO: CLEAR
CO2 SERPL-SCNC: 21 MMOL/L (ref 23–29)
COLOR UR AUTO: COLORLESS
CREAT SERPL-MCNC: 1.2 MG/DL (ref 0.5–1.4)
DIFFERENTIAL METHOD: ABNORMAL
EOSINOPHIL # BLD AUTO: 0.1 K/UL (ref 0–0.5)
EOSINOPHIL NFR BLD: 1.3 % (ref 0–8)
ERYTHROCYTE [DISTWIDTH] IN BLOOD BY AUTOMATED COUNT: 12 % (ref 11.5–14.5)
EST. GFR  (NO RACE VARIABLE): >60 ML/MIN/1.73 M^2
GLUCOSE SERPL-MCNC: 102 MG/DL (ref 70–110)
GLUCOSE UR QL STRIP: NEGATIVE
HCT VFR BLD AUTO: 47.4 % (ref 40–54)
HCV AB SERPL QL IA: NORMAL
HGB BLD-MCNC: 15.1 G/DL (ref 14–18)
HGB UR QL STRIP: NEGATIVE
HIV 1+2 AB+HIV1 P24 AG SERPL QL IA: NORMAL
IMM GRANULOCYTES # BLD AUTO: 0.01 K/UL (ref 0–0.04)
IMM GRANULOCYTES NFR BLD AUTO: 0.1 % (ref 0–0.5)
KETONES UR QL STRIP: NEGATIVE
LEUKOCYTE ESTERASE UR QL STRIP: NEGATIVE
LYMPHOCYTES # BLD AUTO: 1.4 K/UL (ref 1–4.8)
LYMPHOCYTES NFR BLD: 17.9 % (ref 18–48)
MCH RBC QN AUTO: 30.6 PG (ref 27–31)
MCHC RBC AUTO-ENTMCNC: 31.9 G/DL (ref 32–36)
MCV RBC AUTO: 96 FL (ref 82–98)
MONOCYTES # BLD AUTO: 0.7 K/UL (ref 0.3–1)
MONOCYTES NFR BLD: 9.4 % (ref 4–15)
NEUTROPHILS # BLD AUTO: 5.4 K/UL (ref 1.8–7.7)
NEUTROPHILS NFR BLD: 70.6 % (ref 38–73)
NITRITE UR QL STRIP: NEGATIVE
NRBC BLD-RTO: 0 /100 WBC
PH UR STRIP: 6 [PH] (ref 5–8)
PLATELET # BLD AUTO: 272 K/UL (ref 150–450)
PMV BLD AUTO: 9.7 FL (ref 9.2–12.9)
POTASSIUM SERPL-SCNC: 3.5 MMOL/L (ref 3.5–5.1)
PROT SERPL-MCNC: 7.5 G/DL (ref 6–8.4)
PROT UR QL STRIP: NEGATIVE
RBC # BLD AUTO: 4.94 M/UL (ref 4.6–6.2)
SODIUM SERPL-SCNC: 141 MMOL/L (ref 136–145)
SP GR UR STRIP: 1 (ref 1–1.03)
URN SPEC COLLECT METH UR: ABNORMAL
WBC # BLD AUTO: 7.65 K/UL (ref 3.9–12.7)

## 2023-02-08 PROCEDURE — 86803 HEPATITIS C AB TEST: CPT | Performed by: PHYSICIAN ASSISTANT

## 2023-02-08 PROCEDURE — 99285 EMERGENCY DEPT VISIT HI MDM: CPT | Mod: 25

## 2023-02-08 PROCEDURE — 87389 HIV-1 AG W/HIV-1&-2 AB AG IA: CPT | Performed by: PHYSICIAN ASSISTANT

## 2023-02-08 PROCEDURE — 80053 COMPREHEN METABOLIC PANEL: CPT | Performed by: EMERGENCY MEDICINE

## 2023-02-08 PROCEDURE — 99222 1ST HOSP IP/OBS MODERATE 55: CPT | Mod: ,,, | Performed by: PSYCHIATRY & NEUROLOGY

## 2023-02-08 PROCEDURE — 99223 1ST HOSP IP/OBS HIGH 75: CPT | Mod: ,,,

## 2023-02-08 PROCEDURE — 99285 PR EMERGENCY DEPT VISIT,LEVEL V: ICD-10-PCS | Mod: ,,, | Performed by: EMERGENCY MEDICINE

## 2023-02-08 PROCEDURE — 99223 PR INITIAL HOSPITAL CARE,LEVL III: ICD-10-PCS | Mod: ,,,

## 2023-02-08 PROCEDURE — 85025 COMPLETE CBC W/AUTO DIFF WBC: CPT | Performed by: EMERGENCY MEDICINE

## 2023-02-08 PROCEDURE — 99222 PR INITIAL HOSPITAL CARE,LEVL II: ICD-10-PCS | Mod: ,,, | Performed by: PSYCHIATRY & NEUROLOGY

## 2023-02-08 PROCEDURE — 25000003 PHARM REV CODE 250

## 2023-02-08 PROCEDURE — 11000001 HC ACUTE MED/SURG PRIVATE ROOM

## 2023-02-08 PROCEDURE — 99285 EMERGENCY DEPT VISIT HI MDM: CPT | Mod: ,,, | Performed by: EMERGENCY MEDICINE

## 2023-02-08 PROCEDURE — 63600175 PHARM REV CODE 636 W HCPCS: Performed by: EMERGENCY MEDICINE

## 2023-02-08 PROCEDURE — 96361 HYDRATE IV INFUSION ADD-ON: CPT

## 2023-02-08 PROCEDURE — 80175 DRUG SCREEN QUAN LAMOTRIGINE: CPT | Performed by: STUDENT IN AN ORGANIZED HEALTH CARE EDUCATION/TRAINING PROGRAM

## 2023-02-08 PROCEDURE — 80203 DRUG SCREEN QUANT ZONISAMIDE: CPT | Performed by: STUDENT IN AN ORGANIZED HEALTH CARE EDUCATION/TRAINING PROGRAM

## 2023-02-08 PROCEDURE — 81003 URINALYSIS AUTO W/O SCOPE: CPT | Performed by: EMERGENCY MEDICINE

## 2023-02-08 PROCEDURE — 96360 HYDRATION IV INFUSION INIT: CPT

## 2023-02-08 RX ORDER — IPRATROPIUM BROMIDE AND ALBUTEROL SULFATE 2.5; .5 MG/3ML; MG/3ML
3 SOLUTION RESPIRATORY (INHALATION) EVERY 4 HOURS PRN
Status: DISCONTINUED | OUTPATIENT
Start: 2023-02-08 | End: 2023-02-10 | Stop reason: HOSPADM

## 2023-02-08 RX ORDER — ACETAMINOPHEN 325 MG/1
650 TABLET ORAL EVERY 6 HOURS PRN
Status: DISCONTINUED | OUTPATIENT
Start: 2023-02-08 | End: 2023-02-10 | Stop reason: HOSPADM

## 2023-02-08 RX ORDER — IBUPROFEN 200 MG
16 TABLET ORAL
Status: DISCONTINUED | OUTPATIENT
Start: 2023-02-08 | End: 2023-02-10 | Stop reason: HOSPADM

## 2023-02-08 RX ORDER — CLONAZEPAM 0.5 MG/1
1 TABLET ORAL 2 TIMES DAILY
Status: DISCONTINUED | OUTPATIENT
Start: 2023-02-08 | End: 2023-02-10 | Stop reason: HOSPADM

## 2023-02-08 RX ORDER — PROCHLORPERAZINE EDISYLATE 5 MG/ML
5 INJECTION INTRAMUSCULAR; INTRAVENOUS EVERY 6 HOURS PRN
Status: DISCONTINUED | OUTPATIENT
Start: 2023-02-08 | End: 2023-02-10 | Stop reason: HOSPADM

## 2023-02-08 RX ORDER — SODIUM CHLORIDE 0.9 % (FLUSH) 0.9 %
10 SYRINGE (ML) INJECTION EVERY 8 HOURS PRN
Status: DISCONTINUED | OUTPATIENT
Start: 2023-02-08 | End: 2023-02-10 | Stop reason: HOSPADM

## 2023-02-08 RX ORDER — NALOXONE HCL 0.4 MG/ML
0.02 VIAL (ML) INJECTION
Status: DISCONTINUED | OUTPATIENT
Start: 2023-02-08 | End: 2023-02-10 | Stop reason: HOSPADM

## 2023-02-08 RX ORDER — ONDANSETRON 8 MG/1
8 TABLET, ORALLY DISINTEGRATING ORAL EVERY 8 HOURS PRN
Status: DISCONTINUED | OUTPATIENT
Start: 2023-02-08 | End: 2023-02-10 | Stop reason: HOSPADM

## 2023-02-08 RX ORDER — ZONISAMIDE 100 MG/1
100 CAPSULE ORAL EVERY MORNING
Status: DISCONTINUED | OUTPATIENT
Start: 2023-02-09 | End: 2023-02-10 | Stop reason: HOSPADM

## 2023-02-08 RX ORDER — GLUCAGON 1 MG
1 KIT INJECTION
Status: DISCONTINUED | OUTPATIENT
Start: 2023-02-08 | End: 2023-02-10 | Stop reason: HOSPADM

## 2023-02-08 RX ORDER — POLYETHYLENE GLYCOL 3350 17 G/17G
17 POWDER, FOR SOLUTION ORAL 2 TIMES DAILY PRN
Status: DISCONTINUED | OUTPATIENT
Start: 2023-02-08 | End: 2023-02-10 | Stop reason: HOSPADM

## 2023-02-08 RX ORDER — IBUPROFEN 200 MG
24 TABLET ORAL
Status: DISCONTINUED | OUTPATIENT
Start: 2023-02-08 | End: 2023-02-10 | Stop reason: HOSPADM

## 2023-02-08 RX ORDER — MAG HYDROX/ALUMINUM HYD/SIMETH 200-200-20
30 SUSPENSION, ORAL (FINAL DOSE FORM) ORAL 4 TIMES DAILY PRN
Status: DISCONTINUED | OUTPATIENT
Start: 2023-02-08 | End: 2023-02-10 | Stop reason: HOSPADM

## 2023-02-08 RX ORDER — ZONISAMIDE 100 MG/1
100 CAPSULE ORAL EVERY EVENING
Status: DISCONTINUED | OUTPATIENT
Start: 2023-02-08 | End: 2023-02-10 | Stop reason: HOSPADM

## 2023-02-08 RX ADMIN — LAMOTRIGINE 250 MG: 150 TABLET ORAL at 06:02

## 2023-02-08 RX ADMIN — ZONISAMIDE 100 MG: 100 CAPSULE ORAL at 06:02

## 2023-02-08 RX ADMIN — SODIUM CHLORIDE, POTASSIUM CHLORIDE, SODIUM LACTATE AND CALCIUM CHLORIDE 1000 ML: 600; 310; 30; 20 INJECTION, SOLUTION INTRAVENOUS at 01:02

## 2023-02-08 RX ADMIN — CLONAZEPAM 1 MG: 0.5 TABLET ORAL at 08:02

## 2023-02-08 NOTE — CONSULTS
"[facility] - Emergency Dept  Neurology  Consult Note    Patient Name: Pal Mccrary  MRN: [id number]  Admission Date: 2/8/2023  Hospital Length of Stay: 0 days  Code Status: No Order   Attending Provider: Moshe Hernandez MD   Consulting Provider: Darwin Cantu MD  Primary Care Physician: Behram Khan, MD (Inactive)  Principal Problem:Breakthrough seizure    Inpatient consult to Neurology  Consult performed by: Darwin Cantu MD  Consult ordered by: Mary Hollingsworth MD         Subjective:     Chief Complaint:  Seizures/Amnesia     HPI:   MR Mccrary is a 47yoM with epilepsy (Follows with Dr Hernández [place] [facility]) who presented to [facility] ED on 2/8 c/o increased seizure frequency. The patient states that he takes Lamictal 300 BID, Zonegran 11 BID, and Klonipin 1mg BID. He has been trialed on Keppra (S.E.) and Vimpat in the past and no longer takes either medication. He has had seizures for "many years" and has a family history of epilepsy. The patient states that he had nearly a year of seizure freedom before having exacerbation of seizure frequency in July. Wife, at bedside, states that it has been like "a rollercoaster" since that time with waxing and waning seizure frequency. The patient has had GTCs in the past but has only had "staring spells and loss of time" since July. The patient reports that he has also had diffulties with ambulation and balance in the past 2 weeks as well. He has been told that he leans to the right while walking. He has started to walk with a cane for balance recently. The patient has also endorsed some ringing in his ears. He reports that he has been falling more in the past week as well and does not remember his falling events or getting off the ground. He states that he will only know he had fallen after being told about it at a later time. He also texted his wife on the day of admission (2/8) stating that he had worked on several Doy[employer] that morning and that he had no recollection of " doing so. Patient, of note, is scheduled to establish care with Dr Rodriguez (epilepsy) at [facility] later this year.        Past Medical History:   Diagnosis Date    Epilepsy     Seizures        No past surgical history on file.    Review of patient's allergies indicates:   Allergen Reactions    Ibuprofen     Shellfish containing products        No current facility-administered medications on file prior to encounter.     Current Outpatient Medications on File Prior to Encounter   Medication Sig    acetaminophen-codeine 300-30mg (TYLENOL #3) 300-30 mg Tab     lacosamide (VIMPAT) 100 mg Tab Take 100 mg by mouth.    lamoTRIgine (LAMICTAL) 100 MG tablet Take 3 tablets (300 mg total) by mouth once daily.    zonisamide (ZONEGRAN) 100 MG Cap TAKE 2 PO QD     Family History       Problem Relation (Age of Onset)    Heart disease Father          Tobacco Use    Smoking status: Former     Packs/day: 1.50     Years: 24.00     Pack years: 36.00     Types: Vaping with nicotine, Cigarettes    Smokeless tobacco: Current   Substance and Sexual Activity    Alcohol use: Yes     Alcohol/week: 4.0 standard drinks     Types: 4 Cans of beer per week    Drug use: Never    Sexual activity: Yes     Partners: Female     Birth control/protection: None     Review of Systems   Constitutional:  Negative for chills and fever.   HENT:  Negative for rhinorrhea and sneezing.    Eyes:  Negative for discharge, redness and visual disturbance.   Respiratory:  Negative for cough and wheezing.    Gastrointestinal:  Negative for nausea and vomiting.   Skin:  Negative for pallor and rash.   Neurological:  Positive for weakness and numbness.   Objective:     Vital Signs (Most Recent):  Temp: 98.4 °F (36.9 °C) (02/08/23 1051)  Pulse: 89 (02/08/23 1301)  Resp: 12 (02/08/23 1231)  BP: 134/82 (02/08/23 1301)  SpO2: 100 % (02/08/23 1301) Vital Signs (24h Range):  Temp:  [98.4 °F (36.9 °C)] 98.4 °F (36.9 °C)  Pulse:  [] 89  Resp:  [12-18] 12  SpO2:  [96  %-100 %] 100 %  BP: (134-159)/() 134/82     Weight: 65.8 kg (145 lb)  Body mass index is 23.4 kg/m².    Physical Exam  Constitutional:       Appearance: Normal appearance.   HENT:      Head: Normocephalic and atraumatic.      Nose: Nose normal.      Mouth/Throat:      Mouth: Mucous membranes are moist.      Pharynx: Oropharynx is clear.   Eyes:      General: No scleral icterus.     Extraocular Movements: Extraocular movements intact and EOM normal.      Conjunctiva/sclera: Conjunctivae normal.      Pupils: Pupils are equal, round, and reactive to light.   Pulmonary:      Effort: Pulmonary effort is normal. No respiratory distress.   Abdominal:      General: Abdomen is flat. There is no distension.      Tenderness: There is no abdominal tenderness.   Musculoskeletal:         General: No tenderness or deformity. Normal range of motion.      Cervical back: Normal range of motion. No rigidity.   Skin:     General: Skin is warm and dry.      Coloration: Skin is not jaundiced.   Neurological:      Mental Status: He is alert and oriented to person, place, and time.      Cranial Nerves: No cranial nerve deficit.      Sensory: Sensory deficit present.      Motor: Weakness present.      Coordination: Coordination normal.      Deep Tendon Reflexes:      Reflex Scores:       Brachioradialis reflexes are 2+ on the right side and 2+ on the left side.       Patellar reflexes are 2+ on the right side and 2+ on the left side.  Psychiatric:         Speech: Speech normal.       NEUROLOGICAL EXAMINATION:     MENTAL STATUS   Oriented to person, place, and time.   Attention: normal. Concentration: normal.   Speech: speech is normal   Level of consciousness: alert    CRANIAL NERVES     CN II   Visual fields full to confrontation.     CN III, IV, VI   Pupils are equal, round, and reactive to light.  Extraocular motions are normal.   CN III: no CN III palsy  CN VI: no CN VI palsy    CN V   Facial sensation intact.     CN VII   Facial  expression full, symmetric.     CN XI   CN XI normal.     CN XII   CN XII normal.     MOTOR EXAM     Strength   Right biceps: 5/5  Left biceps: 5/5  Right triceps: 4/5  Left triceps: 5/5  Right quadriceps: 4/5  Left quadriceps: 5/5  Right hamstrin/5  Left hamstrin/5  Right anterior tibial: 4/5  Left anterior tibial: 5/5  Right posterior tibial: 4/5  Left posterior tibial: 5/5  Right peroneal: 4/5  Left peroneal: 5/5    REFLEXES     Reflexes   Right brachioradialis: 2+  Left brachioradialis: 2+  Right patellar: 2+  Left patellar: 2+    SENSORY EXAM   Right arm light touch: decreased from elbow  Left arm light touch: normal  Right leg light touch: decreased from knee  Left leg light touch: normal    GAIT AND COORDINATION     Tremor   Resting tremor: absent  Intention tremor: absent    Significant Labs: All pertinent lab results from the past 24 hours have been reviewed.    Significant Imaging: I have reviewed all pertinent imaging results/findings within the past 24 hours.    Assessment and Plan:     Epilepsy  Patient reportedly having increased seizure frequency (staring and loss of time) in past four days. See HPI for full detail. Additionally has been having loss of time during which he has been completing activities. He has been falling more and feeling off balance. He does not remember his falling events. He also endorses tinnitus.    Given history of seizure, with increased frequency, patient warrants further monitoring with EEg while adjusting AED medications  Additionally, given amnestic events, patient would benefit from MRI brain w/w/o. This would aid in workup of gait instability and falls as well.  Some concern for intracranial etiology given RSW and numbness which had not been appreciated by the patient in the past.   May require further workup via ENT if MRI unrevealing. Additionally, given patients high dose of Lamictal, there is some concern for toxicity causing side effects of amnesia and/or  ataxia.     Recommendations:   - EEG overnight   - MRI brain epilepsy protocol with and without contrast   - Seizure precautions   - Lamictal and Zonisamide levels pending, will follow up   - Reduce Lamictal to 250 BID   - Continue home Zonisamide 100 BID   - Will continue to follow patient   - Please contact with any questions or concerns        VTE Risk Mitigation (From admission, onward)    None          Thank you for your consult. I will follow-up with patient. Please contact us if you have any additional questions.    Darwin Cantu MD  Neurology  [facility] - Emergency Dept

## 2023-02-08 NOTE — ASSESSMENT & PLAN NOTE
Epilepsy     Patient reporting increased frequency of absence seizures at home and ataxia. Compliant with lamictal and zonegran.   - Neurology eval in ED, will follow up recs   - decrease lamictal to 250 BID, and continue zonegran 100 BID (7 am/7 pm)   - MRI and EEG pending    - AED levels pending   - due to est with [facility] neurology

## 2023-02-08 NOTE — ASSESSMENT & PLAN NOTE
Patient reportedly having increased seizure frequency (staring and loss of time) in past four days. See HPI for full detail. Additionally has been having loss of time during which he has been completing activities. He has been falling more and feeling off balance. He does not remember his falling events. He also endorses tinnitus.    Given history of seizure, with increased frequency, patient warrants further monitoring with EEg while adjusting AED medications  Additionally, given amnestic events, patient would benefit from MRI brain w/w/o. This would aid in workup of gait instability and falls as well.  Some concern for intracranial etiology given RSW and numbness which had not been appreciated by the patient in the past.   May require further workup via ENT if MRI unrevealing. Additionally, given patients high dose of Lamictal, there is some concern for toxicity causing side effects of amnesia and/or ataxia.     Recommendations:   - EEG overnight   - MRI brain epilepsy protocol with and without contrast   - Seizure precautions   - Lamictal and Zonisamide levels pending, will follow up   - Reduce Lamictal to 250 BID   - Continue home Zonisamide 100 BID   - Will continue to follow patient   - Please contact with any questions or concerns

## 2023-02-08 NOTE — SUBJECTIVE & OBJECTIVE
Past Medical History:   Diagnosis Date    Epilepsy     Seizures        No past surgical history on file.    Review of patient's allergies indicates:   Allergen Reactions    Ibuprofen     Shellfish containing products        No current facility-administered medications on file prior to encounter.     Current Outpatient Medications on File Prior to Encounter   Medication Sig    lamoTRIgine (LAMICTAL) 100 MG tablet Take 3 tablets (300 mg total) by mouth once daily.    zonisamide (ZONEGRAN) 100 MG Cap TAKE 2 PO QD    [DISCONTINUED] acetaminophen-codeine 300-30mg (TYLENOL #3) 300-30 mg Tab     [DISCONTINUED] lacosamide (VIMPAT) 100 mg Tab Take 100 mg by mouth.     Family History       Problem Relation (Age of Onset)    Heart disease Father          Tobacco Use    Smoking status: Former     Packs/day: 1.50     Years: 24.00     Pack years: 36.00     Types: Vaping with nicotine, Cigarettes    Smokeless tobacco: Current   Substance and Sexual Activity    Alcohol use: Yes     Alcohol/week: 4.0 standard drinks     Types: 4 Cans of beer per week    Drug use: Never    Sexual activity: Yes     Partners: Female     Birth control/protection: None     Review of Systems   Constitutional:  Negative for activity change, chills, diaphoresis, fatigue and fever.   HENT:  Negative for congestion, facial swelling, rhinorrhea, sore throat and trouble swallowing.    Eyes:  Negative for photophobia, itching and visual disturbance.   Respiratory:  Negative for cough, chest tightness, shortness of breath and wheezing.    Cardiovascular:  Negative for chest pain, palpitations and leg swelling.   Gastrointestinal:  Negative for abdominal distention, abdominal pain, blood in stool, constipation, diarrhea, nausea and vomiting.   Genitourinary:  Negative for difficulty urinating, dysuria, frequency, hematuria and urgency.   Musculoskeletal:  Positive for gait problem. Negative for arthralgias, back pain and neck stiffness.   Skin:  Negative for color  change and rash.   Neurological:  Positive for seizures and headaches. Negative for dizziness, tremors, syncope, weakness, light-headedness and numbness.   Psychiatric/Behavioral:  Negative for agitation, confusion and hallucinations. The patient is nervous/anxious.    Objective:     Vital Signs (Most Recent):  Temp: 98.4 °F (36.9 °C) (02/08/23 1051)  Pulse: 89 (02/08/23 1301)  Resp: 12 (02/08/23 1231)  BP: 134/82 (02/08/23 1301)  SpO2: 100 % (02/08/23 1301) Vital Signs (24h Range):  Temp:  [98.4 °F (36.9 °C)] 98.4 °F (36.9 °C)  Pulse:  [] 89  Resp:  [12-18] 12  SpO2:  [96 %-100 %] 100 %  BP: (134-159)/() 134/82     Weight: 65.8 kg (145 lb)  Body mass index is 23.4 kg/m².    Physical Exam  Vitals and nursing note reviewed.   Constitutional:       General: He is not in acute distress.     Appearance: He is well-developed.   HENT:      Head: Normocephalic and atraumatic.      Mouth/Throat:      Pharynx: No oropharyngeal exudate.   Eyes:      Conjunctiva/sclera: Conjunctivae normal.      Pupils: Pupils are equal, round, and reactive to light.   Cardiovascular:      Rate and Rhythm: Normal rate and regular rhythm.      Heart sounds: Normal heart sounds.   Pulmonary:      Effort: Pulmonary effort is normal. No respiratory distress.      Breath sounds: Normal breath sounds. No wheezing.   Abdominal:      General: Bowel sounds are normal. There is no distension.      Palpations: Abdomen is soft.      Tenderness: There is no abdominal tenderness.   Musculoskeletal:         General: No tenderness. Normal range of motion.      Cervical back: Normal range of motion and neck supple.   Lymphadenopathy:      Cervical: No cervical adenopathy.   Skin:     General: Skin is warm and dry.      Capillary Refill: Capillary refill takes less than 2 seconds.      Findings: No rash.   Neurological:      Mental Status: He is alert and oriented to person, place, and time.      Cranial Nerves: No cranial nerve deficit.       Sensory: No sensory deficit.      Coordination: Coordination normal.      Gait: Abnormal gait: ambulates with cane.   Psychiatric:         Behavior: Behavior normal.         Thought Content: Thought content normal.         Judgment: Judgment normal.      Comments: Anxious mood          CRANIAL NERVES     CN III, IV, VI   Pupils are equal, round, and reactive to light.     Significant Labs: All pertinent labs within the past 24 hours have been reviewed.  CBC:   Recent Labs   Lab 02/08/23  1258   WBC 7.65   HGB 15.1   HCT 47.4        CMP:   Recent Labs   Lab 02/08/23  1258      K 3.5      CO2 21*      BUN 11   CREATININE 1.2   CALCIUM 10.0   PROT 7.5   ALBUMIN 4.7   BILITOT 0.7   ALKPHOS 89   AST 47*   ALT 88*   ANIONGAP 12       Significant Imaging: I have reviewed all pertinent imaging results/findings within the past 24 hours.    Imaging Results              CT Head Without Contrast (Final result)  Result time 02/08/23 14:17:54      Final result by Lucas Ramsey MD (02/08/23 14:17:54)                   Impression:      No evidence of acute intracranial pathology.    Further assessment as warranted clinically.      Electronically signed by: Lucas Ramsey MD  Date:    02/08/2023  Time:    14:17               Narrative:    EXAMINATION:  CT HEAD WITHOUT CONTRAST    CLINICAL HISTORY:  Seizure, new-onset, no history of trauma;    TECHNIQUE:  Low dose axial CT images obtained throughout the head without the use of intravenous contrast.  Axial, sagittal and coronal reconstructions were performed.    COMPARISON:  None.    FINDINGS:  Intracranial compartment:    Ventricles and sulci are normal in size for age without evidence of hydrocephalus.    The brain parenchyma appears within normal limits.  No parenchymal  hemorrhage, edema, mass effect or major vascular distribution infarct.    No extra-axial blood or fluid collections.    Skull/extracranial contents (limited evaluation):    No  displaced calvarial fracture.    The mastoid air cells and visualized paranasal sinuses are essentially clear.

## 2023-02-08 NOTE — ED PROVIDER NOTES
Encounter Date: 2/8/2023       History     Chief Complaint   Patient presents with    Seizures     Pt states he has been having a seizure for the past 4 days.  Pt has hx epilepsy-compliant with meds-Lamictal 300mg      Patient is a 47-year-old male past medical history of absence seizures followed [facility] [place] presenting today for seizures.  Patient states for the past 4-5 days he has had increasing absent seizures.  They typically last approximately 30 seconds to 1 minute.  His wife describes him as staring episodes.  He also reports ataxia, now requiring a cane over the past several days as well.  No numbness or tingling in extremities.  No headache.  States he has no recollection during these episodes.  They called his neurologist this morning, was referred to the ED for further evaluation.  Patient states he has a follow-up appoint with Dr. Rodriguez in March but has not established care with [facility] yet and is in transition.  He is currently on Lamictal and zonegran.     Review of patient's allergies indicates:   Allergen Reactions    Ibuprofen     Shellfish containing products      Past Medical History:   Diagnosis Date    Epilepsy     Seizures      No past surgical history on file.  Family History   Problem Relation Age of Onset    Heart disease Father      Social History     Tobacco Use    Smoking status: Former     Packs/day: 1.50     Years: 24.00     Pack years: 36.00     Types: Vaping with nicotine, Cigarettes    Smokeless tobacco: Current   Substance Use Topics    Alcohol use: Yes     Alcohol/week: 4.0 standard drinks     Types: 4 Cans of beer per week    Drug use: Never     Review of Systems    Physical Exam     Initial Vitals [02/08/23 1051]   BP Pulse Resp Temp SpO2   (!) 159/100 105 18 98.4 °F (36.9 °C) 96 %      MAP       --         Physical Exam    Nursing note and vitals reviewed.  Constitutional: He appears well-developed and well-nourished. No distress.   HENT:   Dry MM   Eyes: Conjunctivae are  normal.   Neck: Neck supple.   Cardiovascular:  Normal rate, regular rhythm and intact distal pulses.           Pulmonary/Chest: Breath sounds normal. He has no wheezes. He has no rales.   Abdominal: Abdomen is soft. Bowel sounds are normal. There is no abdominal tenderness.   Musculoskeletal:         General: No edema.      Cervical back: Neck supple.     Lymphadenopathy:     He has no cervical adenopathy.   Neurological: He is alert and oriented to person, place, and time. He has normal strength. No cranial nerve deficit or sensory deficit.   Has an occasional tremor in the extremities but not persistent     Skin: No rash noted.   Psychiatric: He has a normal mood and affect.       ED Course   Procedures  Labs Reviewed   CBC W/ AUTO DIFFERENTIAL - Abnormal; Notable for the following components:       Result Value    MCHC 31.9 (*)     Lymph % 17.9 (*)     All other components within normal limits   COMPREHENSIVE METABOLIC PANEL - Abnormal; Notable for the following components:    CO2 21 (*)     AST 47 (*)     ALT 88 (*)     All other components within normal limits   URINALYSIS, REFLEX TO URINE CULTURE - Abnormal; Notable for the following components:    Color, UA Colorless (*)     All other components within normal limits    Narrative:     Specimen Source->Urine   HIV 1 / 2 ANTIBODY    Narrative:     Release to patient->Immediate   HEPATITIS C ANTIBODY    Narrative:     Release to patient->Immediate          Imaging Results              CT Head Without Contrast (Final result)  Result time 02/08/23 14:17:54      Final result by Lucas Ramsey MD (02/08/23 14:17:54)                   Impression:      No evidence of acute intracranial pathology.    Further assessment as warranted clinically.      Electronically signed by: Lucas Ramsey MD  Date:    02/08/2023  Time:    14:17               Narrative:    EXAMINATION:  CT HEAD WITHOUT CONTRAST    CLINICAL HISTORY:  Seizure, new-onset, no history of  trauma;    TECHNIQUE:  Low dose axial CT images obtained throughout the head without the use of intravenous contrast.  Axial, sagittal and coronal reconstructions were performed.    COMPARISON:  None.    FINDINGS:  Intracranial compartment:    Ventricles and sulci are normal in size for age without evidence of hydrocephalus.    The brain parenchyma appears within normal limits.  No parenchymal  hemorrhage, edema, mass effect or major vascular distribution infarct.    No extra-axial blood or fluid collections.    Skull/extracranial contents (limited evaluation):    No displaced calvarial fracture.    The mastoid air cells and visualized paranasal sinuses are essentially clear.                                       Medications   lactated ringers bolus 1,000 mL (0 mLs Intravenous Stopped 2/8/23 1502)     Medical Decision Making:   History:   Old Medical Records: I decided to obtain old medical records.  Initial Assessment:   Emergent evaluation a 47-year-old male presenting today with increased frequency of absent seizures, memory loss, abnormal gait x5 days  Vital signs stable  Overall well-appearing, neuro intact except for an occasional tremor.    Differential Diagnosis:   Intracranial mass, intracranial hemorrhage, breakthrough seizure, less likely infectious etiology  Clinical Tests:   Lab Tests: Reviewed and Ordered  Radiological Study: Ordered and Reviewed  ED Management:  - labs  - CT head  - IVF           ED Course as of 02/08/23 1517   Wed Feb 08, 2023   1418 CT head reviewed by me with no bleed, mass. [GM]   1419 Labs reviewed with no leukocytosis.  Hemoglobin stable.  CMP with bicarb of 21.  Tachycardia resolved after IV fluids.  UA with no infectious process. [GM]   1426 Discussed with Neurology, will evaluate patient [GM]   1516 Pt turned over to Dr. Perea in stable condition pending neurology evaluation.  [GM]      ED Course User Index  [GM] Mary Hollingsworth MD                 Clinical Impression:   Final  diagnoses:  [G40.909] Seizure disorder (Primary)  [G40.909] Recurrent seizures               Mary Hollingsworth MD  02/08/23 3647

## 2023-02-08 NOTE — SUBJECTIVE & OBJECTIVE
Past Medical History:   Diagnosis Date    Epilepsy     Seizures        No past surgical history on file.    Review of patient's allergies indicates:   Allergen Reactions    Ibuprofen     Shellfish containing products        No current facility-administered medications on file prior to encounter.     Current Outpatient Medications on File Prior to Encounter   Medication Sig    acetaminophen-codeine 300-30mg (TYLENOL #3) 300-30 mg Tab     lacosamide (VIMPAT) 100 mg Tab Take 100 mg by mouth.    lamoTRIgine (LAMICTAL) 100 MG tablet Take 3 tablets (300 mg total) by mouth once daily.    zonisamide (ZONEGRAN) 100 MG Cap TAKE 2 PO QD     Family History       Problem Relation (Age of Onset)    Heart disease Father          Tobacco Use    Smoking status: Former     Packs/day: 1.50     Years: 24.00     Pack years: 36.00     Types: Vaping with nicotine, Cigarettes    Smokeless tobacco: Current   Substance and Sexual Activity    Alcohol use: Yes     Alcohol/week: 4.0 standard drinks     Types: 4 Cans of beer per week    Drug use: Never    Sexual activity: Yes     Partners: Female     Birth control/protection: None     Review of Systems   Constitutional:  Negative for chills and fever.   HENT:  Negative for rhinorrhea and sneezing.    Eyes:  Negative for discharge, redness and visual disturbance.   Respiratory:  Negative for cough and wheezing.    Gastrointestinal:  Negative for nausea and vomiting.   Skin:  Negative for pallor and rash.   Neurological:  Positive for weakness and numbness.   Objective:     Vital Signs (Most Recent):  Temp: 98.4 °F (36.9 °C) (02/08/23 1051)  Pulse: 89 (02/08/23 1301)  Resp: 12 (02/08/23 1231)  BP: 134/82 (02/08/23 1301)  SpO2: 100 % (02/08/23 1301) Vital Signs (24h Range):  Temp:  [98.4 °F (36.9 °C)] 98.4 °F (36.9 °C)  Pulse:  [] 89  Resp:  [12-18] 12  SpO2:  [96 %-100 %] 100 %  BP: (134-159)/() 134/82     Weight: 65.8 kg (145 lb)  Body mass index is 23.4 kg/m².    Physical  Exam  Constitutional:       Appearance: Normal appearance.   HENT:      Head: Normocephalic and atraumatic.      Nose: Nose normal.      Mouth/Throat:      Mouth: Mucous membranes are moist.      Pharynx: Oropharynx is clear.   Eyes:      General: No scleral icterus.     Extraocular Movements: Extraocular movements intact and EOM normal.      Conjunctiva/sclera: Conjunctivae normal.      Pupils: Pupils are equal, round, and reactive to light.   Pulmonary:      Effort: Pulmonary effort is normal. No respiratory distress.   Abdominal:      General: Abdomen is flat. There is no distension.      Tenderness: There is no abdominal tenderness.   Musculoskeletal:         General: No tenderness or deformity. Normal range of motion.      Cervical back: Normal range of motion. No rigidity.   Skin:     General: Skin is warm and dry.      Coloration: Skin is not jaundiced.   Neurological:      Mental Status: He is alert and oriented to person, place, and time.      Cranial Nerves: No cranial nerve deficit.      Sensory: Sensory deficit present.      Motor: Weakness present.      Coordination: Coordination normal.      Deep Tendon Reflexes:      Reflex Scores:       Brachioradialis reflexes are 2+ on the right side and 2+ on the left side.       Patellar reflexes are 2+ on the right side and 2+ on the left side.  Psychiatric:         Speech: Speech normal.       NEUROLOGICAL EXAMINATION:     MENTAL STATUS   Oriented to person, place, and time.   Attention: normal. Concentration: normal.   Speech: speech is normal   Level of consciousness: alert    CRANIAL NERVES     CN II   Visual fields full to confrontation.     CN III, IV, VI   Pupils are equal, round, and reactive to light.  Extraocular motions are normal.   CN III: no CN III palsy  CN VI: no CN VI palsy    CN V   Facial sensation intact.     CN VII   Facial expression full, symmetric.     CN XI   CN XI normal.     CN XII   CN XII normal.     MOTOR EXAM     Strength   Right  biceps: 5/5  Left biceps: 5/5  Right triceps: 4/5  Left triceps: 5/5  Right quadriceps: 4/5  Left quadriceps: 5/5  Right hamstrin/5  Left hamstrin/5  Right anterior tibial: 4/5  Left anterior tibial: 5/5  Right posterior tibial: 4/5  Left posterior tibial: 5/5  Right peroneal: 4/5  Left peroneal: 5/5    REFLEXES     Reflexes   Right brachioradialis: 2+  Left brachioradialis: 2+  Right patellar: 2+  Left patellar: 2+    SENSORY EXAM   Right arm light touch: decreased from elbow  Left arm light touch: normal  Right leg light touch: decreased from knee  Left leg light touch: normal    GAIT AND COORDINATION     Tremor   Resting tremor: absent  Intention tremor: absent    Significant Labs: All pertinent lab results from the past 24 hours have been reviewed.    Significant Imaging: I have reviewed all pertinent imaging results/findings within the past 24 hours.

## 2023-02-08 NOTE — HPI
Patient is a 47-year-old male past medical history of absence seizures followed by [facility] [place] presenting today for seizures.  Patient states for the past 4-5 days he has had increasing episodes of absence seizures.  The wife has witnessed the increasing episodes and reports they typically last approximately 30 seconds to 1 minute. The patient does not have recollection of them occurring. He also reports ataxia, now requiring a cane over the past several days as well.  No numbness or tingling in extremities, no weakness. He endorses a diffuse headache. Denies recent illness, fevers or chills, confusion, lethargy, slurred speech, falls, LOC, CP, SOB, abdominal pain, N/V. Patient called his neurologist this morning and he was referred to the ED for further evaluation.  Patient states he has a follow-up appoint with Dr. Rodriguez in March but has not established care with [facility] yet..  He is currently on Lamictal and zonegran and endorses compliance.     In the ED: HR elevated to 105 initially, but now stable ~80 bpm. Otherwise VSS. AF without leukocytosis. Labs grossly unremarkable. UA noninfectious. CTH without acute intracranial abnormality. Given 1L LR bolus. Evaluated by neuro in ED, will follow.

## 2023-02-08 NOTE — HPI
"MR Mccrary is a 47yoM with epilepsy (Follows with Dr Hernández in [facility]) who presented to [facility] ED on 2/8 c/o increased seizure frequency. The patient states that he takes Lamictal 300 BID, Zonegran 11 BID, and Klonipin 1mg BID. He has been trialed on Keppra (S.E.) and Vimpat in the past and no longer takes either medication. He has had seizures for "many years" and has a family history of epilepsy. The patient states that he had nearly a year of seizure freedom before having exacerbation of seizure frequency in July. Wife, at bedside, states that it has been like "a rollercoaster" since that time with waxing and waning seizure frequency. The patient has had GTCs in the past but has only had "staring spells and loss of time" since July. The patient reports that he has also had diffulties with ambulation and balance in the past 2 weeks as well. He has been told that he leans to the right while walking. He has started to walk with a cane for balance recently. The patient has also endorsed some ringing in his ears. He reports that he has been falling more in the past week as well and does not remember his falling events or getting off the ground. He states that he will only know he had fallen after being told about it at a later time. He also texted his wife on the day of admission (2/8) stating that he had worked on several [employer] that morning and that he had no recollection of doing so. Patient, of note, is scheduled to establish care with Dr Rodriguez (epilepsy) at [facility] later this year.   "

## 2023-02-08 NOTE — H&P
[facility] - Emergency Dept  [facility] Medicine  History & Physical    Patient Name: Pal Mccrary  MRN: [id number]  Patient Class: IP- Inpatient  Admission Date: 2/8/2023  Attending Physician: Moshe Hernandez MD   Primary Care Provider: Behram Khan, MD (Inactive)         Patient information was obtained from patient, spouse/SO and ER records.     Subjective:     Principal Problem:Breakthrough seizure    Chief Complaint:   Chief Complaint   Patient presents with    Seizures     Pt states he has been having a seizure for the past 4 days.  Pt has hx epilepsy-compliant with meds-Lamictal 300mg         HPI: Patient is a 47-year-old male past medical history of absence seizures followed by [facility] [place] presenting today for seizures.  Patient states for the past 4-5 days he has had increasing episodes of absence seizures.  The wife has witnessed the increasing episodes and reports they typically last approximately 30 seconds to 1 minute. The patient does not have recollection of them occurring. He also reports ataxia, now requiring a cane over the past several days as well.  No numbness or tingling in extremities, no weakness. He endorses a diffuse headache. Denies recent illness, fevers or chills, confusion, lethargy, slurred speech, falls, LOC, CP, SOB, abdominal pain, N/V. Patient called his neurologist this morning and he was referred to the ED for further evaluation.  Patient states he has a follow-up appoint with Dr. Rodriguez in March but has not established care with [facility] yet..  He is currently on Lamictal and zonegran and endorses compliance.     In the ED: HR elevated to 105 initially, but now stable ~80 bpm. Otherwise VSS. AF without leukocytosis. Labs grossly unremarkable. UA noninfectious. CTH without acute intracranial abnormality. Given 1L LR bolus. Evaluated by neuro in ED, will follow.       Past Medical History:   Diagnosis Date    Epilepsy     Seizures        No past surgical history on file.    Review  of patient's allergies indicates:   Allergen Reactions    Ibuprofen     Shellfish containing products        No current facility-administered medications on file prior to encounter.     Current Outpatient Medications on File Prior to Encounter   Medication Sig    lamoTRIgine (LAMICTAL) 100 MG tablet Take 3 tablets (300 mg total) by mouth once daily.    zonisamide (ZONEGRAN) 100 MG Cap TAKE 2 PO QD    [DISCONTINUED] acetaminophen-codeine 300-30mg (TYLENOL #3) 300-30 mg Tab     [DISCONTINUED] lacosamide (VIMPAT) 100 mg Tab Take 100 mg by mouth.     Family History       Problem Relation (Age of Onset)    Heart disease Father          Tobacco Use    Smoking status: Former     Packs/day: 1.50     Years: 24.00     Pack years: 36.00     Types: Vaping with nicotine, Cigarettes    Smokeless tobacco: Current   Substance and Sexual Activity    Alcohol use: Yes     Alcohol/week: 4.0 standard drinks     Types: 4 Cans of beer per week    Drug use: Never    Sexual activity: Yes     Partners: Female     Birth control/protection: None     Review of Systems   Constitutional:  Negative for activity change, chills, diaphoresis, fatigue and fever.   HENT:  Negative for congestion, facial swelling, rhinorrhea, sore throat and trouble swallowing.    Eyes:  Negative for photophobia, itching and visual disturbance.   Respiratory:  Negative for cough, chest tightness, shortness of breath and wheezing.    Cardiovascular:  Negative for chest pain, palpitations and leg swelling.   Gastrointestinal:  Negative for abdominal distention, abdominal pain, blood in stool, constipation, diarrhea, nausea and vomiting.   Genitourinary:  Negative for difficulty urinating, dysuria, frequency, hematuria and urgency.   Musculoskeletal:  Positive for gait problem. Negative for arthralgias, back pain and neck stiffness.   Skin:  Negative for color change and rash.   Neurological:  Positive for seizures and headaches. Negative for dizziness, tremors,  syncope, weakness, light-headedness and numbness.   Psychiatric/Behavioral:  Negative for agitation, confusion and hallucinations. The patient is nervous/anxious.    Objective:     Vital Signs (Most Recent):  Temp: 98.4 °F (36.9 °C) (02/08/23 1051)  Pulse: 89 (02/08/23 1301)  Resp: 12 (02/08/23 1231)  BP: 134/82 (02/08/23 1301)  SpO2: 100 % (02/08/23 1301) Vital Signs (24h Range):  Temp:  [98.4 °F (36.9 °C)] 98.4 °F (36.9 °C)  Pulse:  [] 89  Resp:  [12-18] 12  SpO2:  [96 %-100 %] 100 %  BP: (134-159)/() 134/82     Weight: 65.8 kg (145 lb)  Body mass index is 23.4 kg/m².    Physical Exam  Vitals and nursing note reviewed.   Constitutional:       General: He is not in acute distress.     Appearance: He is well-developed.   HENT:      Head: Normocephalic and atraumatic.      Mouth/Throat:      Pharynx: No oropharyngeal exudate.   Eyes:      Conjunctiva/sclera: Conjunctivae normal.      Pupils: Pupils are equal, round, and reactive to light.   Cardiovascular:      Rate and Rhythm: Normal rate and regular rhythm.      Heart sounds: Normal heart sounds.   Pulmonary:      Effort: Pulmonary effort is normal. No respiratory distress.      Breath sounds: Normal breath sounds. No wheezing.   Abdominal:      General: Bowel sounds are normal. There is no distension.      Palpations: Abdomen is soft.      Tenderness: There is no abdominal tenderness.   Musculoskeletal:         General: No tenderness. Normal range of motion.      Cervical back: Normal range of motion and neck supple.   Lymphadenopathy:      Cervical: No cervical adenopathy.   Skin:     General: Skin is warm and dry.      Capillary Refill: Capillary refill takes less than 2 seconds.      Findings: No rash.   Neurological:      Mental Status: He is alert and oriented to person, place, and time.      Cranial Nerves: No cranial nerve deficit.      Sensory: No sensory deficit.      Coordination: Coordination normal.      Gait: Abnormal gait: ambulates with  cane.   Psychiatric:         Behavior: Behavior normal.         Thought Content: Thought content normal.         Judgment: Judgment normal.      Comments: Anxious mood          CRANIAL NERVES     CN III, IV, VI   Pupils are equal, round, and reactive to light.     Significant Labs: All pertinent labs within the past 24 hours have been reviewed.  CBC:   Recent Labs   Lab 02/08/23  1258   WBC 7.65   HGB 15.1   HCT 47.4        CMP:   Recent Labs   Lab 02/08/23  1258      K 3.5      CO2 21*      BUN 11   CREATININE 1.2   CALCIUM 10.0   PROT 7.5   ALBUMIN 4.7   BILITOT 0.7   ALKPHOS 89   AST 47*   ALT 88*   ANIONGAP 12       Significant Imaging: I have reviewed all pertinent imaging results/findings within the past 24 hours.    Imaging Results              CT Head Without Contrast (Final result)  Result time 02/08/23 14:17:54      Final result by Lucas Ramsey MD (02/08/23 14:17:54)                   Impression:      No evidence of acute intracranial pathology.    Further assessment as warranted clinically.      Electronically signed by: Lucas Ramsey MD  Date:    02/08/2023  Time:    14:17               Narrative:    EXAMINATION:  CT HEAD WITHOUT CONTRAST    CLINICAL HISTORY:  Seizure, new-onset, no history of trauma;    TECHNIQUE:  Low dose axial CT images obtained throughout the head without the use of intravenous contrast.  Axial, sagittal and coronal reconstructions were performed.    COMPARISON:  None.    FINDINGS:  Intracranial compartment:    Ventricles and sulci are normal in size for age without evidence of hydrocephalus.    The brain parenchyma appears within normal limits.  No parenchymal  hemorrhage, edema, mass effect or major vascular distribution infarct.    No extra-axial blood or fluid collections.    Skull/extracranial contents (limited evaluation):    No displaced calvarial fracture.    The mastoid air cells and visualized paranasal sinuses are essentially clear.                                         Assessment/Plan:     * Breakthrough seizure  Epilepsy     Patient reporting increased frequency of absence seizures at home and ataxia. Compliant with lamictal and zonegran.   - Neurology eval in ED, will follow up recs   - decrease lamictal to 250 BID, and continue zonegran 100 BID (7 am/7 pm)   - MRI and EEG pending    - AED levels pending   - due to est with [facility]           Anxiety  - continue home klonopin 1 mg BID   -  reviewed        VTE Risk Mitigation (From admission, onward)         Ordered     IP VTE LOW RISK PATIENT  Once         02/08/23 1637     Place sequential compression device  Until discontinued         02/08/23 1637                   Mellisa Minor PA-C  Department of Hospital Medicine   [facility] - Emergency Dept

## 2023-02-09 PROBLEM — R26.81 GAIT INSTABILITY: Status: ACTIVE | Noted: 2023-02-09

## 2023-02-09 PROBLEM — R41.3 AMNESIA: Status: ACTIVE | Noted: 2023-02-09

## 2023-02-09 LAB
ANION GAP SERPL CALC-SCNC: 13 MMOL/L (ref 8–16)
BASOPHILS # BLD AUTO: 0.05 K/UL (ref 0–0.2)
BASOPHILS NFR BLD: 0.8 % (ref 0–1.9)
BUN SERPL-MCNC: 12 MG/DL (ref 6–20)
CALCIUM SERPL-MCNC: 9.4 MG/DL (ref 8.7–10.5)
CHLORIDE SERPL-SCNC: 110 MMOL/L (ref 95–110)
CO2 SERPL-SCNC: 17 MMOL/L (ref 23–29)
CREAT SERPL-MCNC: 1 MG/DL (ref 0.5–1.4)
DIFFERENTIAL METHOD: ABNORMAL
EOSINOPHIL # BLD AUTO: 0.3 K/UL (ref 0–0.5)
EOSINOPHIL NFR BLD: 5.1 % (ref 0–8)
ERYTHROCYTE [DISTWIDTH] IN BLOOD BY AUTOMATED COUNT: 12 % (ref 11.5–14.5)
EST. GFR  (NO RACE VARIABLE): >60 ML/MIN/1.73 M^2
GLUCOSE SERPL-MCNC: 90 MG/DL (ref 70–110)
HCT VFR BLD AUTO: 42.6 % (ref 40–54)
HGB BLD-MCNC: 13.8 G/DL (ref 14–18)
IMM GRANULOCYTES # BLD AUTO: 0.01 K/UL (ref 0–0.04)
IMM GRANULOCYTES NFR BLD AUTO: 0.2 % (ref 0–0.5)
LYMPHOCYTES # BLD AUTO: 1.7 K/UL (ref 1–4.8)
LYMPHOCYTES NFR BLD: 29.3 % (ref 18–48)
MAGNESIUM SERPL-MCNC: 2 MG/DL (ref 1.6–2.6)
MCH RBC QN AUTO: 30.4 PG (ref 27–31)
MCHC RBC AUTO-ENTMCNC: 32.4 G/DL (ref 32–36)
MCV RBC AUTO: 94 FL (ref 82–98)
MONOCYTES # BLD AUTO: 0.6 K/UL (ref 0.3–1)
MONOCYTES NFR BLD: 10.8 % (ref 4–15)
NEUTROPHILS # BLD AUTO: 3.2 K/UL (ref 1.8–7.7)
NEUTROPHILS NFR BLD: 53.8 % (ref 38–73)
NRBC BLD-RTO: 0 /100 WBC
PLATELET # BLD AUTO: 239 K/UL (ref 150–450)
PMV BLD AUTO: 9.6 FL (ref 9.2–12.9)
POTASSIUM SERPL-SCNC: 4.1 MMOL/L (ref 3.5–5.1)
RBC # BLD AUTO: 4.54 M/UL (ref 4.6–6.2)
SODIUM SERPL-SCNC: 140 MMOL/L (ref 136–145)
WBC # BLD AUTO: 5.91 K/UL (ref 3.9–12.7)

## 2023-02-09 PROCEDURE — 95714 VEEG EA 12-26 HR UNMNTR: CPT

## 2023-02-09 PROCEDURE — 11000001 HC ACUTE MED/SURG PRIVATE ROOM

## 2023-02-09 PROCEDURE — 99233 PR SUBSEQUENT HOSPITAL CARE,LEVL III: ICD-10-PCS | Mod: ,,, | Performed by: HOSPITALIST

## 2023-02-09 PROCEDURE — 25500020 PHARM REV CODE 255: Performed by: STUDENT IN AN ORGANIZED HEALTH CARE EDUCATION/TRAINING PROGRAM

## 2023-02-09 PROCEDURE — 80048 BASIC METABOLIC PNL TOTAL CA: CPT

## 2023-02-09 PROCEDURE — 95700 EEG CONT REC W/VID EEG TECH: CPT

## 2023-02-09 PROCEDURE — 85025 COMPLETE CBC W/AUTO DIFF WBC: CPT

## 2023-02-09 PROCEDURE — 25000003 PHARM REV CODE 250

## 2023-02-09 PROCEDURE — 99233 SBSQ HOSP IP/OBS HIGH 50: CPT | Mod: ,,, | Performed by: HOSPITALIST

## 2023-02-09 PROCEDURE — 83735 ASSAY OF MAGNESIUM: CPT

## 2023-02-09 PROCEDURE — A9585 GADOBUTROL INJECTION: HCPCS | Performed by: STUDENT IN AN ORGANIZED HEALTH CARE EDUCATION/TRAINING PROGRAM

## 2023-02-09 PROCEDURE — 36415 COLL VENOUS BLD VENIPUNCTURE: CPT

## 2023-02-09 RX ORDER — GADOBUTROL 604.72 MG/ML
7 INJECTION INTRAVENOUS
Status: COMPLETED | OUTPATIENT
Start: 2023-02-09 | End: 2023-02-09

## 2023-02-09 RX ORDER — LANOLIN ALCOHOL/MO/W.PET/CERES
100 CREAM (GRAM) TOPICAL DAILY
COMMUNITY

## 2023-02-09 RX ORDER — FOLIC ACID 0.4 MG
800 TABLET ORAL DAILY
COMMUNITY

## 2023-02-09 RX ORDER — CLONAZEPAM 1 MG/1
1 TABLET ORAL 2 TIMES DAILY
COMMUNITY
End: 2023-10-25 | Stop reason: SDUPTHER

## 2023-02-09 RX ADMIN — CLONAZEPAM 1 MG: 0.5 TABLET ORAL at 09:02

## 2023-02-09 RX ADMIN — ZONISAMIDE 100 MG: 100 CAPSULE ORAL at 06:02

## 2023-02-09 RX ADMIN — LAMOTRIGINE 250 MG: 150 TABLET ORAL at 06:02

## 2023-02-09 RX ADMIN — GADOBUTROL 7 ML: 604.72 INJECTION INTRAVENOUS at 04:02

## 2023-02-09 RX ADMIN — ACETAMINOPHEN 650 MG: 325 TABLET ORAL at 04:02

## 2023-02-09 NOTE — PHARMACY MED REC
"Admission Medication Reconciliation - Pharmacy Consult Note    The home medication history was taken by Rodrigo Mars PharmD.  Based on information gathered and subsequent review by the clinical pharmacist, the items below may need attention.     Medication list obtained from patient's spouse.    You may go to "Admission" then "Reconcile Home Medications" tabs to review and/or act upon these items.     Current Outpatient Medications on File Prior to Encounter   Medication Sig    clonazePAM (KLONOPIN) 1 MG tablet   Take 1 mg by mouth 2 (two) times daily.    folic acid (FOLVITE) 400 MCG tablet   Take 800 mcg by mouth once daily.    lamoTRIgine (LAMICTAL) 100 MG tablet Take 300 mg by mouth 2 (two) times daily      thiamine 100 MG tablet Take 100 mg by mouth once daily.      zonisamide (ZONEGRAN) 100 MG Cap Take 100 mg by mouth 2 (two) times daily.       Please address this information as you see fit.  Feel free to contact us if you have any questions or require assistance.    Rodrigo Mars PharmD  Spectra [phone]    "

## 2023-02-09 NOTE — SUBJECTIVE & OBJECTIVE
Subjective:     Interval History: Patient with several pushbutton events overnight. EEG in place.    Review of Systems   Constitutional:  Negative for chills and fever.   HENT:  Negative for rhinorrhea and sneezing.    Eyes:  Negative for discharge, redness and visual disturbance.   Respiratory:  Negative for cough and wheezing.    Gastrointestinal:  Negative for nausea and vomiting.   Skin:  Negative for pallor and rash.   Neurological:  Positive for weakness and numbness.   Objective:     Vital Signs (Most Recent):  Temp: 97 °F (36.1 °C) (02/09/23 0800)  Pulse: 74 (02/09/23 0800)  Resp: 17 (02/09/23 0800)  BP: 134/84 (02/09/23 0800)  SpO2: 99 % (02/09/23 0800) Vital Signs (24h Range):  Temp:  [97 °F (36.1 °C)-98.3 °F (36.8 °C)] 97 °F (36.1 °C)  Pulse:  [65-84] 74  Resp:  [17-20] 17  SpO2:  [93 %-99 %] 99 %  BP: ()/(58-84) 134/84     Weight: 65.8 kg (145 lb)  Body mass index is 23.4 kg/m².    Physical Exam  Constitutional:       Appearance: Normal appearance.   HENT:      Head: Normocephalic and atraumatic.      Nose: Nose normal.      Mouth/Throat:      Mouth: Mucous membranes are moist.      Pharynx: Oropharynx is clear.   Eyes:      General: No scleral icterus.     Extraocular Movements: Extraocular movements intact and EOM normal.      Conjunctiva/sclera: Conjunctivae normal.      Pupils: Pupils are equal, round, and reactive to light.   Pulmonary:      Effort: Pulmonary effort is normal. No respiratory distress.   Abdominal:      General: Abdomen is flat. There is no distension.      Tenderness: There is no abdominal tenderness.   Musculoskeletal:         General: No tenderness or deformity. Normal range of motion.      Cervical back: Normal range of motion. No rigidity.   Skin:     General: Skin is warm and dry.      Coloration: Skin is not jaundiced.   Neurological:      Mental Status: He is alert and oriented to person, place, and time.      Cranial Nerves: No cranial nerve deficit.      Sensory:  Sensory deficit present.      Motor: Weakness present.      Coordination: Coordination normal.      Deep Tendon Reflexes:      Reflex Scores:       Brachioradialis reflexes are 2+ on the right side and 2+ on the left side.       Patellar reflexes are 2+ on the right side and 2+ on the left side.  Psychiatric:         Speech: Speech normal.       NEUROLOGICAL EXAMINATION:     MENTAL STATUS   Oriented to person, place, and time.   Attention: normal. Concentration: normal.   Speech: speech is normal   Level of consciousness: alert    CRANIAL NERVES     CN II   Visual fields full to confrontation.     CN III, IV, VI   Pupils are equal, round, and reactive to light.  Extraocular motions are normal.   CN III: no CN III palsy  CN VI: no CN VI palsy    CN V   Facial sensation intact.     CN VII   Facial expression full, symmetric.     CN XI   CN XI normal.     CN XII   CN XII normal.     MOTOR EXAM     Strength   Right biceps: 5/5  Left biceps: 5/5  Right triceps: 4/5  Left triceps: 5/5  Right quadriceps: 4/5  Left quadriceps: 5/5  Right hamstrin/5  Left hamstrin/5  Right anterior tibial: 4/5  Left anterior tibial: 5/5  Right posterior tibial: 4/5  Left posterior tibial: 5/5  Right peroneal: 4/5  Left peroneal: 5/5    REFLEXES     Reflexes   Right brachioradialis: 2+  Left brachioradialis: 2+  Right patellar: 2+  Left patellar: 2+    SENSORY EXAM   Right arm light touch: decreased from elbow  Left arm light touch: normal  Right leg light touch: decreased from knee  Left leg light touch: normal    GAIT AND COORDINATION     Tremor   Resting tremor: absent  Intention tremor: absent    Significant Labs: All pertinent lab results from the past 24 hours have been reviewed.    Significant Imaging: I have reviewed all pertinent imaging results/findings within the past 24 hours.

## 2023-02-09 NOTE — PLAN OF CARE
[facility] - Neurosurgery (Hospital)  Initial Discharge Assessment       Primary Care Provider: Behram Khan, MD (Inactive)    Admission Diagnosis: Seizure disorder [G40.909]  Recurrent seizures [G40.909]  Chest pain [R07.9]    Admission Date: 2/8/2023  Expected Discharge Date:     Discharge Barriers Identified: None    Payor: [employer] / Plan: [facility] CHOICE PLUS / Product Type: Commercial /     Extended Emergency Contact Information  Primary Emergency Contact: Gisell Mccrary  Mobile Phone: [phone]  Relation: Spouse  Preferred language: English   needed? No    Discharge Plan A: Home with family  Discharge Plan B: Home with family    [id number] [place], [place] - [address] AT [address]  [address]  [facility] [address]  Phone: [phone] Fax: [phone]    Initial Assessment (most recent)       Adult Discharge Assessment - 02/09/23 1452          Discharge Assessment    Assessment Type Discharge Planning Assessment     Confirmed/corrected address, phone number and insurance Yes     Confirmed Demographics Correct on Facesheet     Source of Information family     If unable to respond/provide information was family/caregiver contacted? Yes     Contact Name/Number Dl Woodard [phone]     Communicated LAVINIA with patient/caregiver Date not available/Unable to determine     Reason For Admission Breakthrough seizure     People in Home spouse     Facility Arrived From: Home     Do you expect to return to your current living situation? Yes     Do you have help at home or someone to help you manage your care at home? Yes     Who are your caregiver(s) and their phone number(s)? Dl Woodard [phone]     Prior to hospitilization cognitive status: Unable to Assess     Current cognitive status: Unable to Assess     Home Accessibility not wheelchair accessible     Home Layout Able to live on 1st floor     Equipment Currently Used  at Home none     Readmission within 30 days? No     Patient currently being followed by outpatient case management? No     Do you currently have service(s) that help you manage your care at home? No     Do you take prescription medications? Yes     Do you have prescription coverage? Yes     Coverage Breakthrough seizure     Do you have any problems affording any of your prescribed medications? No     Is the patient taking medications as prescribed? yes     Who is going to help you get home at discharge? Wife Gisell Woodard [phone]     How do you get to doctors appointments? family or friend will provide     Are you on dialysis? No     Do you take coumadin? No     Discharge Plan A Home with family     Discharge Plan B Home with family     DME Needed Upon Discharge  none     Discharge Plan discussed with: Spouse/sig other     Name(s) and Number(s) Dl Woodard [phone]     Discharge Barriers Identified None                   Pt lives alone with his spouse and their dog in a one story home with 4 CLAUS. Pt was independent prior to admission and uses no DME, is not on dialysis or Coumadin. Pt has transportation home with family. SW will follow for all discharge planning needs.     FLORENCIA London, YUMIKO  [facility]  [id number]

## 2023-02-09 NOTE — NURSING
"Pt refusing the telesitter.  States," The buzzing of the camera reminds me of the siren and I cannot take it.  Every time I hear the siren sound I close my ears."  Educated on fall risk and safety.  Charge Nurse Allyson made aware and at the bedside addressing pt's concerns.  WCTM.  "

## 2023-02-09 NOTE — NURSING
"Pt refusing bed alarm.  Educated on the importance of setting the bed alarm on for safety.    States,"I would want to move around without being restricted by the bed alarm."  Instructed to call the staff for assistance.  WCTM.    "

## 2023-02-09 NOTE — SUBJECTIVE & OBJECTIVE
Interval History:   2/9: EEG started today    Review of Systems   Constitutional:  Negative for chills and fever.   HENT:  Negative for rhinorrhea and sneezing.    Eyes:  Negative for discharge, redness and visual disturbance.   Respiratory:  Negative for cough and wheezing.    Gastrointestinal:  Negative for nausea and vomiting.   Skin:  Negative for pallor and rash.   Neurological:  Positive for weakness and numbness.   Objective:     Vital Signs (Most Recent):  Temp: 97 °F (36.1 °C) (02/09/23 0800)  Pulse: 74 (02/09/23 0800)  Resp: 17 (02/09/23 0800)  BP: 134/84 (02/09/23 0800)  SpO2: 99 % (02/09/23 0800) Vital Signs (24h Range):  Temp:  [97 °F (36.1 °C)-98.3 °F (36.8 °C)] 97 °F (36.1 °C)  Pulse:  [65-84] 74  Resp:  [17-20] 17  SpO2:  [93 %-99 %] 99 %  BP: ()/(58-84) 134/84     Weight: 65.8 kg (145 lb)  Body mass index is 23.4 kg/m².  No intake or output data in the 24 hours ending 02/09/23 1604   Physical Exam  Constitutional:       Appearance: Normal appearance.   HENT:      Head: Normocephalic and atraumatic.      Nose: Nose normal.      Mouth/Throat:      Mouth: Mucous membranes are moist.      Pharynx: Oropharynx is clear.   Eyes:      General: No scleral icterus.     Extraocular Movements: Extraocular movements intact.      Conjunctiva/sclera: Conjunctivae normal.      Pupils: Pupils are equal, round, and reactive to light.   Pulmonary:      Effort: Pulmonary effort is normal. No respiratory distress.   Abdominal:      General: Abdomen is flat. There is no distension.      Tenderness: There is no abdominal tenderness.   Musculoskeletal:         General: No tenderness or deformity. Normal range of motion.      Cervical back: Normal range of motion. No rigidity.   Skin:     General: Skin is warm and dry.      Coloration: Skin is not jaundiced.   Neurological:      Mental Status: He is alert and oriented to person, place, and time.      Cranial Nerves: No cranial nerve deficit.      Sensory: Sensory  deficit present.      Motor: Weakness present.      Coordination: Coordination normal.      Deep Tendon Reflexes:      Reflex Scores:       Brachioradialis reflexes are 2+ on the right side and 2+ on the left side.       Patellar reflexes are 2+ on the right side and 2+ on the left side.  Psychiatric:         Speech: Speech normal.       Significant Labs: All pertinent labs within the past 24 hours have been reviewed.    Significant Imaging: I have reviewed all pertinent imaging results/findings within the past 24 hours.

## 2023-02-09 NOTE — PROCEDURES
EEG Extended Monitoring greater than one hour    Date/Time: 2/8/2023 12:12 PM  Performed by: French Soto MD  Authorized by: Mellisa Minor PA-C     EEG Prelim Read: EEG WNL with PDR 10. Continuing to monitor for event capture.

## 2023-02-09 NOTE — NURSING
Nurses Note -- 4 Eyes      2/8/2023   1915PM      Skin assessed during: Admit      [x] No Pressure Injuries Present    []Prevention Measures Documented      [] Yes- Altered Skin Integrity Present or Discovered   [] LDA Added if Not in Epic (Describe Wound)   [] New Altered Skin Integrity was Present on Admit and Documented in LDA   [] Wound Image Taken    Wound Care Consulted? No    Attending Nurse : Dolores Chavez RN    Second RN/Staff Member:  Neha Yen.

## 2023-02-09 NOTE — PROGRESS NOTES
[facility] - Neurosurgery ([facility])  [facility] Medicine  Progress Note    Patient Name: Pal Mccrary  MRN: [id number]  Patient Class: IP- Inpatient   Admission Date: 2/8/2023  Length of Stay: 1 days  Attending Physician: Meghan Garay MD  Primary Care Provider: Behram Khan, MD (Inactive)        Subjective:     Principal Problem:Breakthrough seizure        HPI:  Patient is a 47-year-old male past medical history of absence seizures followed by [facility] [place] presenting today for seizures.  Patient states for the past 4-5 days he has had increasing episodes of absence seizures.  The wife has witnessed the increasing episodes and reports they typically last approximately 30 seconds to 1 minute. The patient does not have recollection of them occurring. He also reports ataxia, now requiring a cane over the past several days as well.  No numbness or tingling in extremities, no weakness. He endorses a diffuse headache. Denies recent illness, fevers or chills, confusion, lethargy, slurred speech, falls, LOC, CP, SOB, abdominal pain, N/V. Patient called his neurologist this morning and he was referred to the ED for further evaluation.  Patient states he has a follow-up appoint with Dr. Rodriguez in March but has not established care with [facility] yet..  He is currently on Lamictal and zonegran and endorses compliance.     In the ED: HR elevated to 105 initially, but now stable ~80 bpm. Otherwise VSS. AF without leukocytosis. Labs grossly unremarkable. UA noninfectious. CTH without acute intracranial abnormality. Given 1L LR bolus. Evaluated by neuro in ED, will follow.       Overview/Hospital Course:  No notes on file    Interval History:   2/9: EEG started today    Review of Systems   Constitutional:  Negative for chills and fever.   HENT:  Negative for rhinorrhea and sneezing.    Eyes:  Negative for discharge, redness and visual disturbance.   Respiratory:  Negative for cough and wheezing.    Gastrointestinal:  Negative for  nausea and vomiting.   Skin:  Negative for pallor and rash.   Neurological:  Positive for weakness and numbness.   Objective:     Vital Signs (Most Recent):  Temp: 97 °F (36.1 °C) (02/09/23 0800)  Pulse: 74 (02/09/23 0800)  Resp: 17 (02/09/23 0800)  BP: 134/84 (02/09/23 0800)  SpO2: 99 % (02/09/23 0800) Vital Signs (24h Range):  Temp:  [97 °F (36.1 °C)-98.3 °F (36.8 °C)] 97 °F (36.1 °C)  Pulse:  [65-84] 74  Resp:  [17-20] 17  SpO2:  [93 %-99 %] 99 %  BP: ()/(58-84) 134/84     Weight: 65.8 kg (145 lb)  Body mass index is 23.4 kg/m².  No intake or output data in the 24 hours ending 02/09/23 1604   Physical Exam  Constitutional:       Appearance: Normal appearance.   HENT:      Head: Normocephalic and atraumatic.      Nose: Nose normal.      Mouth/Throat:      Mouth: Mucous membranes are moist.      Pharynx: Oropharynx is clear.   Eyes:      General: No scleral icterus.     Extraocular Movements: Extraocular movements intact.      Conjunctiva/sclera: Conjunctivae normal.      Pupils: Pupils are equal, round, and reactive to light.   Pulmonary:      Effort: Pulmonary effort is normal. No respiratory distress.   Abdominal:      General: Abdomen is flat. There is no distension.      Tenderness: There is no abdominal tenderness.   Musculoskeletal:         General: No tenderness or deformity. Normal range of motion.      Cervical back: Normal range of motion. No rigidity.   Skin:     General: Skin is warm and dry.      Coloration: Skin is not jaundiced.   Neurological:      Mental Status: He is alert and oriented to person, place, and time.      Cranial Nerves: No cranial nerve deficit.      Sensory: Sensory deficit present.      Motor: Weakness present.      Coordination: Coordination normal.      Deep Tendon Reflexes:      Reflex Scores:       Brachioradialis reflexes are 2+ on the right side and 2+ on the left side.       Patellar reflexes are 2+ on the right side and 2+ on the left side.  Psychiatric:          Speech: Speech normal.       Significant Labs: All pertinent labs within the past 24 hours have been reviewed.    Significant Imaging: I have reviewed all pertinent imaging results/findings within the past 24 hours.      Assessment/Plan:      * Breakthrough seizure  Epilepsy     Patient reporting increased frequency of absence seizures at home and ataxia. Compliant with lamictal and zonegran.   - Neurology eval in ED, will follow up recs   - decrease lamictal to 250 BID, and continue zonegran 100 BID (7 am/7 pm)   - MRI and EEG pending    - AED levels pending   - due to est with [facility]           Anxiety  - continue home klonopin 1 mg BID   -  reviewed        VTE Risk Mitigation (From admission, onward)         Ordered     IP VTE LOW RISK PATIENT  Once         02/08/23 1637     Place sequential compression device  Until discontinued         02/08/23 1637                Discharge Planning   LAVINIA:      Code Status: Full Code   Is the patient medically ready for discharge?:     Reason for patient still in hospital (select all that apply): Patient trending condition  Discharge Plan A: Home with family                  Meghan Garay MD  Department of Hospital Medicine   [facility] - Neurosurgery ([facility])

## 2023-02-09 NOTE — ASSESSMENT & PLAN NOTE
Patient reportedly having increased seizure frequency (staring and loss of time) in past four days. See HPI for full detail. Additionally has been having loss of time during which he has been completing activities. He has been falling more and feeling off balance. He does not remember his falling events. He also endorses tinnitus.    MRI brain without evidence of intracranial pathology contributing to his gait instability, increased seizure frequency, or amenstic events  Given patients high dose of Lamictal, there is some concern for toxicity causing side effects of amnesia and/or ataxia. Discussed EEG findings (normal without electrographic correlate of events). Dose therefore decreased to 250 BID. Will discharge on this dose along with home Zonisamide 100 BID. Patient would like to be aken off of klonopin due to perceived side effects. Discussed with patient that this will have to be weaned slowly. 0.5 BID for one month and then 0.5 daily for one month before stopping. Disucssed risks of stopping abruptly. Patient will need to follow up outpatient with Dr Rodriguez (scheduleD).     Recommendations:   - EEG without electrographic correlate of pushbutton events   - Seizure precautions while inpatient   - Lamictal and Zonisamide levels pending, can follow up result in outpatient setting   - Continue Lamictal 250 BID (decreased dose) at time of discharge   - Continue home Zonisamide 100 BID at time of discharge   - Recommend decreasing clonazepam to 0.5 BID for one month followed by 0.5 daily and then stoppin   - patient voiced concern of side effects of medication   - Discussed seizure hygiene and precautions with patient including not driving for 6 months from time of last event concerning for seizure, heights and water   - Discussed plan with primary team and patient who voiced understanding     Will sign off at this time  EEG to be taken off   - Please contact with any questions or concerns

## 2023-02-09 NOTE — NURSING
Contacted Dr. Roberto Adrian via secure chat about the pt to be hooked up on EEG monitor tonight.  To wait until tomorrow 2/9/23 to get EEG hooked up per Dr. Adrian.

## 2023-02-09 NOTE — PLAN OF CARE
Problem: Adult Inpatient Plan of Care  Goal: Plan of Care Review  Outcome: Ongoing, Progressing  Flowsheets (Taken 2/9/2023 0242)  Plan of Care Reviewed With: patient     Problem: Fall Injury Risk  Goal: Absence of Fall and Fall-Related Injury  Outcome: Ongoing, Progressing  Intervention: Promote Injury-Free Environment  Flowsheets (Taken 2/9/2023 0242)  Safety Promotion/Fall Prevention:   Fall Risk signage in place   Fall Risk reviewed with patient/family   instructed to call staff for mobility   supervised activity   side rails raised x 3   lighting adjusted   medications reviewed     Problem: Seizure, Active Management  Goal: Absence of Seizure/Seizure-Related Injury  Outcome: Ongoing, Progressing  Intervention: Prevent Seizure-Related Injury  Flowsheets (Taken 2/9/2023 0242)  Seizure Precautions:   activity supervised   clutter-free environment maintained   emergency equipment at bedside   side rails padded    POC reviewed with pt at the bedside, needs reinforcement.  Concerns addressed, questions answered.  No event observed at this time.  Non compliant with safety measures.  Educated on the importance of safety and fall risks.  VS monitored, see VS in the flowsheet.  Seizure precautions maintained.  Instructed to call staff for assistance.  Bed locked.  Call light within reach.      Subjective:       Patient ID: Rohini Amanda is a 55 y.o. female.    Chief Complaint: Follow-up    Pt is a 55 y.o. female who presents for evaluation and management of   Encounter Diagnoses   Name Primary?    Primary hypertension Yes    Hypothyroidism due to acquired atrophy of thyroid     RLS (restless legs syndrome)     B12 deficiency     Alopecia      .  Doing well on current meds. Denies any side effects. Prevention is up to date.    Review of Systems   Constitutional: Negative for activity change and unexpected weight change.   HENT: Negative for hearing loss, rhinorrhea and trouble swallowing.    Eyes: Negative for discharge and visual disturbance.   Respiratory: Negative for chest tightness and wheezing.    Cardiovascular: Negative for chest pain and palpitations.   Gastrointestinal: Negative for blood in stool, constipation, diarrhea and vomiting.   Endocrine: Negative for polydipsia and polyuria.   Genitourinary: Negative for difficulty urinating, dysuria, hematuria and menstrual problem.   Musculoskeletal: Positive for arthralgias. Negative for joint swelling and neck pain.   Neurological: Negative for weakness and headaches.   Psychiatric/Behavioral: Negative for confusion and dysphoric mood.       Objective:      Physical Exam  Constitutional:       Appearance: She is well-developed.   HENT:      Head: Normocephalic and atraumatic.      Right Ear: External ear normal.      Left Ear: External ear normal.      Nose: Nose normal.   Eyes:      Pupils: Pupils are equal, round, and reactive to light.   Neck:      Thyroid: No thyromegaly.      Vascular: No JVD.      Trachea: No tracheal deviation.   Cardiovascular:      Rate and Rhythm: Normal rate.      Heart sounds: Normal heart sounds. No murmur heard.  Pulmonary:      Effort: Pulmonary effort is normal. No respiratory distress.      Breath sounds: Normal breath sounds. No wheezing or rales.   Chest:      Chest wall: No tenderness.   Abdominal:  "     General: Bowel sounds are normal. There is no distension.      Palpations: Abdomen is soft. There is no mass.      Tenderness: There is no abdominal tenderness. There is no guarding or rebound.   Musculoskeletal:         General: No tenderness. Normal range of motion.      Cervical back: Normal range of motion and neck supple.   Lymphadenopathy:      Cervical: No cervical adenopathy.   Skin:     General: Skin is warm and dry.      Coloration: Skin is not pale.      Findings: No erythema or rash.   Neurological:      Mental Status: She is alert and oriented to person, place, and time.      Cranial Nerves: No cranial nerve deficit.      Motor: No abnormal muscle tone.      Coordination: Coordination normal.      Deep Tendon Reflexes: Reflexes are normal and symmetric. Reflexes normal.   Psychiatric:         Behavior: Behavior normal.         Thought Content: Thought content normal.         Judgment: Judgment normal.         Assessment:       1. Primary hypertension    2. Hypothyroidism due to acquired atrophy of thyroid    3. RLS (restless legs syndrome)    4. B12 deficiency    5. Alopecia        Plan:   Rohini was seen today for follow-up.    Diagnoses and all orders for this visit:    Primary hypertension  -     TSH; Future  -     Comprehensive Metabolic Panel; Future    Hypothyroidism due to acquired atrophy of thyroid  -     levothyroxine (SYNTHROID) 75 MCG tablet; Take 1 tablet (75 mcg total) by mouth before breakfast.  -     TSH; Future    RLS (restless legs syndrome)  -     Vitamin B12; Future    B12 deficiency  -     syringe with needle (SYRINGE 3CC/25GX1") 3 mL 25 gauge x 1" Syrg; 1 Device by Misc.(Non-Drug; Combo Route) route every 14 (fourteen) days.  -     cyanocobalamin 1,000 mcg/mL injection; Inject 1 mL (1,000 mcg total) into the skin every 14 (fourteen) days.  -     Vitamin B12; Future    Alopecia  -     spironolactone (ALDACTONE) 50 MG tablet; Take 1 tablet (50 mg total) by mouth once daily.  -   " "  TSH; Future      Problem List Items Addressed This Visit     HTN (hypertension) - Primary    Overview     Stopping aldactone 2/22 per pt request. Will monitor BP at home and resume if >140/90             Relevant Orders    TSH    Comprehensive Metabolic Panel    Hypothyroidism due to acquired atrophy of thyroid    Overview     Synthroid 50  Lab Results   Component Value Date    TSH 2.545 03/11/2021                Relevant Medications    levothyroxine (SYNTHROID) 75 MCG tablet    Other Relevant Orders    TSH    RLS (restless legs syndrome)    Overview     Has failed requip, contact derm with [facility], mirapex stopped working   Klonopin helps some   Cycling back to requip to see if she can get some relief--this did work in the beginning for a little while   Increasing requip to max dose of 4mg on 7/20/21 2/22/22  Trial of d/c klonopin---down to 0.25 for month then stop. Observe                   Relevant Orders    Vitamin B12      Other Visit Diagnoses     B12 deficiency        Relevant Medications    syringe with needle (SYRINGE 3CC/25GX1") 3 mL 25 gauge x 1" Syrg    cyanocobalamin 1,000 mcg/mL injection    Other Relevant Orders    Vitamin B12    Alopecia        Relevant Medications    spironolactone (ALDACTONE) 50 MG tablet    Other Relevant Orders    TSH        INCREASING LEVOTHYROXINE TO 75 SINCE I AM STOPPING HER KLONOPIN AND MAY MAKE HER rls WORSE.   aLSO DOING THIS SINCE I AM STOPPING HER ALDACTONE PER HER REQUEST. GETTING HER TSH BELOW 2 WILL HELP WITH HER HAIR AND RLS  ALSO INCREASING HER B12 TO TWICE A MONTH SINCE HER B12 IS BORDERLINE LOW      rtc 6 MONTHS WITH LABS     No follow-ups on file.        "

## 2023-02-10 VITALS
DIASTOLIC BLOOD PRESSURE: 67 MMHG | BODY MASS INDEX: 23.3 KG/M2 | HEIGHT: 66 IN | OXYGEN SATURATION: 100 % | SYSTOLIC BLOOD PRESSURE: 121 MMHG | RESPIRATION RATE: 18 BRPM | HEART RATE: 74 BPM | TEMPERATURE: 98 F | WEIGHT: 145 LBS

## 2023-02-10 LAB
ANION GAP SERPL CALC-SCNC: 11 MMOL/L (ref 8–16)
BUN SERPL-MCNC: 14 MG/DL (ref 6–20)
CALCIUM SERPL-MCNC: 8.7 MG/DL (ref 8.7–10.5)
CHLORIDE SERPL-SCNC: 107 MMOL/L (ref 95–110)
CO2 SERPL-SCNC: 22 MMOL/L (ref 23–29)
CREAT SERPL-MCNC: 0.9 MG/DL (ref 0.5–1.4)
EST. GFR  (NO RACE VARIABLE): >60 ML/MIN/1.73 M^2
GLUCOSE SERPL-MCNC: 94 MG/DL (ref 70–110)
MAGNESIUM SERPL-MCNC: 2 MG/DL (ref 1.6–2.6)
POTASSIUM SERPL-SCNC: 3.7 MMOL/L (ref 3.5–5.1)
SODIUM SERPL-SCNC: 140 MMOL/L (ref 136–145)

## 2023-02-10 PROCEDURE — 95720 PR EEG, W/VIDEO, CONT RECORD, I&R, >12<26 HRS: ICD-10-PCS | Mod: ,,, | Performed by: STUDENT IN AN ORGANIZED HEALTH CARE EDUCATION/TRAINING PROGRAM

## 2023-02-10 PROCEDURE — 99233 SBSQ HOSP IP/OBS HIGH 50: CPT | Mod: ,,, | Performed by: STUDENT IN AN ORGANIZED HEALTH CARE EDUCATION/TRAINING PROGRAM

## 2023-02-10 PROCEDURE — 99239 HOSP IP/OBS DSCHRG MGMT >30: CPT | Mod: ,,, | Performed by: HOSPITALIST

## 2023-02-10 PROCEDURE — 95720 EEG PHY/QHP EA INCR W/VEEG: CPT | Mod: ,,, | Performed by: STUDENT IN AN ORGANIZED HEALTH CARE EDUCATION/TRAINING PROGRAM

## 2023-02-10 PROCEDURE — 80048 BASIC METABOLIC PNL TOTAL CA: CPT

## 2023-02-10 PROCEDURE — 99239 PR HOSPITAL DISCHARGE DAY,>30 MIN: ICD-10-PCS | Mod: ,,, | Performed by: HOSPITALIST

## 2023-02-10 PROCEDURE — 99233 PR SUBSEQUENT HOSPITAL CARE,LEVL III: ICD-10-PCS | Mod: ,,, | Performed by: STUDENT IN AN ORGANIZED HEALTH CARE EDUCATION/TRAINING PROGRAM

## 2023-02-10 PROCEDURE — 83735 ASSAY OF MAGNESIUM: CPT

## 2023-02-10 PROCEDURE — 25000003 PHARM REV CODE 250

## 2023-02-10 PROCEDURE — 36415 COLL VENOUS BLD VENIPUNCTURE: CPT

## 2023-02-10 RX ADMIN — CLONAZEPAM 1 MG: 0.5 TABLET ORAL at 09:02

## 2023-02-10 RX ADMIN — LAMOTRIGINE 250 MG: 150 TABLET ORAL at 06:02

## 2023-02-10 RX ADMIN — ZONISAMIDE 100 MG: 100 CAPSULE ORAL at 06:02

## 2023-02-10 NOTE — PT/OT/SLP PROGRESS
Physical Therapy      Patient Name:  Pal Mccrary   MRN:  [id number]    Patient noted to be up and walking in the room independently with EEG intact. PT spoke with pt who states he is able to perform functional mobility as he had prior to admit and does not require PT at this time. PT orders D/[place]. Ankita Liu PT 2/10/23

## 2023-02-10 NOTE — NURSING
Patient received discharge orders. Discharge instructions reviewed with family and patient. Patent demonstrated understanding of discharge instructions. IV and tele removed. Prescriptions given to patient. Waiting for transportation to be discharged home with family. Rudi.

## 2023-02-10 NOTE — DISCHARGE INSTRUCTIONS
Clonazepam taper instructions    0.5 BID for one month and then doing 0.5 daily for one month then stopping

## 2023-02-10 NOTE — PROCEDURES
VIDEO ELECTROENCEPHALOGRAM  REPORT    DATE OF SERVICE: 2/10/23  EEG NUMBER: FH -1  REQUESTED BY:  David  LOCATION OF SERVICE:  [facility]    [facility]   Electroencephalographic (EEG) recording is with electrodes placed according to the International 10-20 placement system.  Thirty two (32) channels of digital signal (sampling rate of 512/sec) including T1 and T2 was simultaneously recorded from the scalp and may include  EKG, EMG, and/or eye monitors.  Recording band pass was 0.1 to 512 hz.  Digital video recording of the patient is simultaneously recorded with the EEG.  The patient is instructed report clinical symptoms which may occur during the recording session.  EEG and video recording is stored and archived in digital format.  Activation procedures which include photic stimulation, hyperventilation and instructing patients to perform simple task are done in selected patients.   The EEG is displayed on a monitor screen and can be reviewed using different montages.  Computer assisted analysis is employed to detect spike and electrographic seizure activity.   The entire record is submitted for computer analysis.  The entire recording is visually reviewed and the times identified by computer analysis as being spikes or seizures are reviewed again.  Compresses spectral analysis (CSA) is also performed on the activity recorded from each individual channel.  This is displayed as a power display of frequencies from 0 to 30 Hz over time.   The CSA is reviewed looking for asymmetries in power between homologous areas of the scalp and then compared with the original EEG recording.     4INFO software was also utilized in the review of this study.  This software suite analyzes the EEG recording in multiple domains.  Coherence and rhythmicity is computed to identify EEG sections which may contain organized seizures.  Each channel undergoes analysis to detect presence of spike and sharp waves which have special and  morphological characteristic of epileptic activity.  The routine EEG recording is converted from spacial into frequency domain.  This is then displayed comparing homologous areas to identify areas of significant asymmetry.  Algorithm to identify non-cortically generated artifact is used to separate eye movement, EMG and other artifact from the EEG.      ELECTROENCEPHALOGRAM:    DAY 1:  RECORDING TIMES:  Start on 2/9/23 at 10:58:19  Stop on 2/10/23 at 07:00:00    A total of 20 hrs and 2 min of EEG recording was obtained.    Indication: 47 year old male with history of epilepsy, refractory with increased seizure like events.  EEG to evaluate for epileptiform discharges and for spell capture.  Patient's AED regimen during this EEG is as follows: lamotrigine 250 mg BID, zonegran 100 mg BID, klonopin 1 mg BID    State of Consciousness:   Awake and asleep    Background:   The background is well organized, symmetric and continuous.  There is a normal anterior to posterior gradient consisting of 5-10 mcV amplitude beta activity in the frontal region and well defined alpha activity in the posterior region.   There is a well developed 30-70 uV, 10 Hz posterior dominant rhythm that is symmetric and reactive to eye opening and closure         Sleep:   The patient reaches stage 2 sleep with symmetric vertex waves, sleep spindles, and k complexes noted.     Non-epileptiform Abnormalities:  Excessive fast activity    Epileptiform Abnormalities:   None    EKG:   Normal sinus rhythm    Activating procedures:   - Hyperventilation and photic stimulation are not performed    Events:   Nonepileptic event #1 occurs on 2/9/23 at 11:16:39  Clinical semiology: Patient starts to groan and speech is stuttering.    EEG: no epileptiform activity noted.  Patient's normal PDR is visible during the event.     Nonepileptic event #2 occurs on 2/9/23 at 11:55:39  Clinical semiology: patient has eye deviation upwards for a few seconds, then says this  "his wife that his "vision is going wonky".  He is stuttering while he talks.    EEG: no epileptiform activity noted.  Patient's normal PDR is visible during the event.       Nonepileptic event #3 occurs on 2/9/23 at 12:43:58  Clinical semiology: patient is sitting up in bed and not responding to his wife.    EEG: no epileptiform activity noted, background is normal    Nonepileptic event #4 occurs on 2/9/23 at 15:05:30  Clinical semiology: patient is lying in bed with eyes closed.  He does not respond to wife briefly, then says that he "can't event talk".  He asks her to push the button.  EEG: no epileptiform activity noted, background is normal    Nonepileptic event #5 occurs on 2/9/23 at 15:22:55  Clinical semiology: patient lying in bed holding his wife's hand.  He points with his right arm upwards in the air and waves it around repeatedly.  He then covers his eyes with his arm and asks his wife to press the button.  He remains with his arm covering his eyes for several seconds, but then starts to talk and interact normally when a team of physicians enter the room.   EEG: no epileptiform activity noted, background is normal    Nonepileptic event #6 occurs on 2/9/23 at 19:54:33  Clinical semiology: patient has side to side head shaking movements and decreased responsiveness.  EEG: no epileptiform activity noted, background is normal.  There is artifact centered at 02 related to head shaking and electrode rubbing against pillow noted.      Nonepileptic event #7 occurs on 2/9/23 at 21:02:48  Clinical semiology: patient reports his eyes are becoming "all wonky"  EEG: no epileptiform activity noted, background is normal.    Impression:   Normal EEG with noted excessive fast activity related to medication effect.    Clinical interpretation:  This is a normal awake and sleep EEG.  Excessive fast activity is noted, which is not epileptic in nature but the result of sedative medications (benzodiazepines).  A total of 7 " clinical events with variable semiology (decreased responsiveness, stuttering, vision changes, and side to side head shaking) were recorded without any epileptiform activity noted on EEG, indicative of a diagnosis of functional neurological disorder/nonepileptic spells.  A concurrent diagnosis of epilepsy is not ruled out on the basis of this EEG and per clinical history the patient has other types of clinical events (generalized convulsions and periods of confusion/amnesia) that were not recorded during this study.  If concern for co-morbid epilepsy remains in the future, further extended EEG monitoring for event characterization could be considered.     Le Woods MD  [facility]   Department of Neurology

## 2023-02-10 NOTE — PLAN OF CARE
SSC met with patient/family at bedside. Patient experience rounding completed and reviewed the following.     Do you know your discharge plan? Yes    If yes, what is the plan? Home    If you are discharging home, do you have help at home? Yes     Do you think you will need help at home at discharge? No     Have you discussed your needs and preferences with your SW/CM? Yes     Assigned SW/CM notified of any patient/family needs or concerns.

## 2023-02-10 NOTE — PROGRESS NOTES
"[facility] - Neuro[facility] ([facility])  Neurology  Progress Note    Patient Name: Pal Mccrary  MRN: [id number]  Admission Date: 2/8/2023  Hospital Length of Stay: 2 days  Code Status: Full Code   Attending Provider: Meghan Garay MD  Primary Care Physician: Behram Khan, MD (Inactive)   Principal Problem:Breakthrough seizure    HPI:   MR Mccrary is a 47yoM with epilepsy (Follows with Dr Hernández in [facility]) who presented to [facility] [place] on 2[place]8 c/o increased seizure frequency. The patient states that he takes Lamictal 300 BID, Zonegran 11 BID, and Klonipin 1mg BID. He has been trialed on Keppra (S.E.) and Vimpat in the past and no longer takes either medication. He has had seizures for "many years" and has a family history of epilepsy. The patient states that he had nearly a year of seizure freedom before having exacerbation of seizure frequency in July. Wife, at bedside, states that it has been like "a rollercoaster" since that time with waxing and waning seizure frequency. The patient has had GTCs in the past but has only had "staring spells and loss of time" since July. The patient reports that he has also had diffulties with ambulation and balance in the past 2 weeks as well. He has been told that he leans to the right while walking. He has started to walk with a cane for balance recently. The patient has also endorsed some ringing in his ears. He reports that he has been falling more in the past week as well and does not remember his falling events or getting off the ground. He states that he will only know he had fallen after being told about it at a later time. He also texted his wife on the day of admission (2/8) stating that he had worked on several [employer] that morning and that he had no recollection of doing so. Patient, of note, is scheduled to establish care with Dr Rodriguez (epilepsy) at [facility] later this year.       Overview/Hospital Course:  No notes on file      Subjective:     Interval History: Patient " with several pushbutton events overnight. EEG in place.    Review of Systems   Constitutional:  Negative for chills and fever.   HENT:  Negative for rhinorrhea and sneezing.    Eyes:  Negative for discharge, redness and visual disturbance.   Respiratory:  Negative for cough and wheezing.    Gastrointestinal:  Negative for nausea and vomiting.   Skin:  Negative for pallor and rash.   Neurological:  Positive for weakness and numbness.   Objective:     Vital Signs (Most Recent):  Temp: 97 °F (36.1 °C) (02/09/23 0800)  Pulse: 74 (02/09/23 0800)  Resp: 17 (02/09/23 0800)  BP: 134/84 (02/09/23 0800)  SpO2: 99 % (02/09/23 0800) Vital Signs (24h Range):  Temp:  [97 °F (36.1 °C)-98.3 °F (36.8 °C)] 97 °F (36.1 °C)  Pulse:  [65-84] 74  Resp:  [17-20] 17  SpO2:  [93 %-99 %] 99 %  BP: ()/(58-84) 134/84     Weight: 65.8 kg (145 lb)  Body mass index is 23.4 kg/m².    Physical Exam  Constitutional:       Appearance: Normal appearance.   HENT:      Head: Normocephalic and atraumatic.      Nose: Nose normal.      Mouth/Throat:      Mouth: Mucous membranes are moist.      Pharynx: Oropharynx is clear.   Eyes:      General: No scleral icterus.     Extraocular Movements: Extraocular movements intact and EOM normal.      Conjunctiva/sclera: Conjunctivae normal.      Pupils: Pupils are equal, round, and reactive to light.   Pulmonary:      Effort: Pulmonary effort is normal. No respiratory distress.   Abdominal:      General: Abdomen is flat. There is no distension.      Tenderness: There is no abdominal tenderness.   Musculoskeletal:         General: No tenderness or deformity. Normal range of motion.      Cervical back: Normal range of motion. No rigidity.   Skin:     General: Skin is warm and dry.      Coloration: Skin is not jaundiced.   Neurological:      Mental Status: He is alert and oriented to person, place, and time.      Cranial Nerves: No cranial nerve deficit.      Sensory: Sensory deficit present.      Motor: Weakness  present.      Coordination: Coordination normal.      Deep Tendon Reflexes:      Reflex Scores:       Brachioradialis reflexes are 2+ on the right side and 2+ on the left side.       Patellar reflexes are 2+ on the right side and 2+ on the left side.  Psychiatric:         Speech: Speech normal.       NEUROLOGICAL EXAMINATION:     MENTAL STATUS   Oriented to person, place, and time.   Attention: normal. Concentration: normal.   Speech: speech is normal   Level of consciousness: alert    CRANIAL NERVES     CN II   Visual fields full to confrontation.     CN III, IV, VI   Pupils are equal, round, and reactive to light.  Extraocular motions are normal.   CN III: no CN III palsy  CN VI: no CN VI palsy    CN V   Facial sensation intact.     CN VII   Facial expression full, symmetric.     CN XI   CN XI normal.     CN XII   CN XII normal.     MOTOR EXAM     Strength   Right biceps: 5/5  Left biceps: 5/5  Right triceps: 4/5  Left triceps: 5/5  Right quadriceps: 4/5  Left quadriceps: 5/5  Right hamstrin/5  Left hamstrin/5  Right anterior tibial: 4/5  Left anterior tibial: 5/5  Right posterior tibial: 4/5  Left posterior tibial: 5/5  Right peroneal: 4/5  Left peroneal: 5/5    REFLEXES     Reflexes   Right brachioradialis: 2+  Left brachioradialis: 2+  Right patellar: 2+  Left patellar: 2+    SENSORY EXAM   Right arm light touch: decreased from elbow  Left arm light touch: normal  Right leg light touch: decreased from knee  Left leg light touch: normal    GAIT AND COORDINATION     Tremor   Resting tremor: absent  Intention tremor: absent    Significant Labs: All pertinent lab results from the past 24 hours have been reviewed.    Significant Imaging: I have reviewed all pertinent imaging results/findings within the past 24 hours.    Assessment and Plan:     Epilepsy  Patient reportedly having increased seizure frequency (staring and loss of time) in past four days. See HPI for full detail. Additionally has been having  loss of time during which he has been completing activities. He has been falling more and feeling off balance. He does not remember his falling events. He also endorses tinnitus.    MRI brain without evidence of intracranial pathology contributing to his gait instability, increased seizure frequency, or amenstic events  Given patients high dose of Lamictal, there is some concern for toxicity causing side effects of amnesia and/or ataxia. Discussed EEG findings (normal without electrographic correlate of events). Dose therefore decreased to 250 BID. Will discharge on this dose along with home Zonisamide 100 BID. Patient would like to be aken off of klonopin due to perceived side effects. Discussed with patient that this will have to be weaned slowly. 0.5 BID for one month and then 0.5 daily for one month before stopping. Disucssed risks of stopping abruptly. Patient will need to follow up outpatient with Dr Rodriguez (scheduleD).     Recommendations:   - EEG without electrographic correlate of pushbutton events   - Seizure precautions while inpatient   - Lamictal and Zonisamide levels pending, can follow up result in outpatient setting   - Continue Lamictal 250 BID (decreased dose) at time of discharge   - Continue home Zonisamide 100 BID at time of discharge   - Recommend decreasing clonazepam to 0.5 BID for one month followed by 0.5 daily and then stoppin   - patient voiced concern of side effects of medication   - Discussed seizure hygiene and precautions with patient including not driving for 6 months from time of last event concerning for seizure, heights and water   - Discussed plan with primary team and patient who voiced understanding     Will sign off at this time  EEG to be taken off   - Please contact with any questions or concerns    Gait instability  See principle problem    Amnesia  See principle problem        VTE Risk Mitigation (From admission, onward)         Ordered     IP VTE LOW RISK PATIENT  Once          02/08/23 1637     Place sequential compression device  Until discontinued         02/08/23 1637                Darwin Cantu MD  Neurology  [facility] - Neurosurgery ([facility]

## 2023-02-10 NOTE — PLAN OF CARE
Problem: Adult Inpatient Plan of Care  Goal: Plan of Care Review  Outcome: Ongoing, Progressing  Goal: Patient-Specific Goal (Individualized)  Outcome: Ongoing, Progressing  Goal: Absence of Hospital-Acquired Illness or Injury  Outcome: Ongoing, Progressing  Goal: Optimal Comfort and Wellbeing  Outcome: Ongoing, Progressing  Goal: Readiness for Transition of Care  Outcome: Ongoing, Progressing     Problem: Infection  Goal: Absence of Infection Signs and Symptoms  Outcome: Ongoing, Progressing     Problem: Fall Injury Risk  Goal: Absence of Fall and Fall-Related Injury  Outcome: Ongoing, Progressing     Problem: Seizure, Active Management  Goal: Absence of Seizure/Seizure-Related Injury  Outcome: Ongoing, Progressing    Pt is a&ox4, ambulatory and independent, eeg monitoring in place, complains of ha 7/10 declined pain medication offered states nothing helps so he just doesn't want to take anything. Pt refuses bed alarm. Wife at bedside both states he doesn't need it. VSS call bell in reach no events of seizures noted during the night. Will continue to monitor

## 2023-02-10 NOTE — PT/OT/SLP PROGRESS
Occupational Therapy      Patient Name:  Pal Mccrary   MRN:  [id number]    OT orders received. Upon arrival, pt ambulating in room independent with EEG on. Pt reports being at baseline function and does not require (A) for ADLs or functional mobility. OT d/c.    2/10/2023

## 2023-02-11 LAB
LAMOTRIGINE SERPL-MCNC: 13.2 UG/ML (ref 2–15)
ZONISAMIDE SERPL-MCNC: 4.5 MCG/ML (ref 10–40)

## 2023-02-14 PROBLEM — G40.909 SEIZURE DISORDER: Status: ACTIVE | Noted: 2023-02-14

## 2023-02-17 ENCOUNTER — OFFICE VISIT (OUTPATIENT)
Dept: INTERNAL MEDICINE | Facility: CLINIC | Age: 48
End: 2023-02-17
Payer: COMMERCIAL

## 2023-02-17 VITALS
WEIGHT: 136 LBS | BODY MASS INDEX: 21.86 KG/M2 | HEART RATE: 89 BPM | HEIGHT: 66 IN | SYSTOLIC BLOOD PRESSURE: 124 MMHG | OXYGEN SATURATION: 98 % | DIASTOLIC BLOOD PRESSURE: 70 MMHG

## 2023-02-17 DIAGNOSIS — G40.909 SEIZURE DISORDER: ICD-10-CM

## 2023-02-17 DIAGNOSIS — Z09 HOSPITAL DISCHARGE FOLLOW-UP: Primary | ICD-10-CM

## 2023-02-17 PROBLEM — R41.3 AMNESIA: Status: RESOLVED | Noted: 2023-02-09 | Resolved: 2023-02-17

## 2023-02-17 PROBLEM — R05.9 COUGH: Status: RESOLVED | Noted: 2021-03-09 | Resolved: 2023-02-17

## 2023-02-17 PROBLEM — G40.919 BREAKTHROUGH SEIZURE: Status: RESOLVED | Noted: 2019-12-15 | Resolved: 2023-02-17

## 2023-02-17 PROBLEM — R04.2 HEMOPTYSIS: Status: RESOLVED | Noted: 2021-03-09 | Resolved: 2023-02-17

## 2023-02-17 PROCEDURE — 3074F PR MOST RECENT SYSTOLIC BLOOD PRESSURE < 130 MM HG: ICD-10-PCS | Mod: CPTII,S$GLB,, | Performed by: INTERNAL MEDICINE

## 2023-02-17 PROCEDURE — 1160F RVW MEDS BY RX/DR IN RCRD: CPT | Mod: CPTII,S$GLB,, | Performed by: INTERNAL MEDICINE

## 2023-02-17 PROCEDURE — 99214 PR OFFICE/OUTPT VISIT, EST, LEVL IV, 30-39 MIN: ICD-10-PCS | Mod: S$GLB,,, | Performed by: INTERNAL MEDICINE

## 2023-02-17 PROCEDURE — 99999 PR PBB SHADOW E&M-EST. PATIENT-LVL III: CPT | Mod: PBBFAC,,, | Performed by: INTERNAL MEDICINE

## 2023-02-17 PROCEDURE — 99999 PR PBB SHADOW E&M-EST. PATIENT-LVL III: ICD-10-PCS | Mod: PBBFAC,,, | Performed by: INTERNAL MEDICINE

## 2023-02-17 PROCEDURE — 1160F PR REVIEW ALL MEDS BY PRESCRIBER/CLIN PHARMACIST DOCUMENTED: ICD-10-PCS | Mod: CPTII,S$GLB,, | Performed by: INTERNAL MEDICINE

## 2023-02-17 PROCEDURE — 3008F BODY MASS INDEX DOCD: CPT | Mod: CPTII,S$GLB,, | Performed by: INTERNAL MEDICINE

## 2023-02-17 PROCEDURE — 99214 OFFICE O/P EST MOD 30 MIN: CPT | Mod: S$GLB,,, | Performed by: INTERNAL MEDICINE

## 2023-02-17 PROCEDURE — 1111F PR DISCHARGE MEDS RECONCILED W/ CURRENT OUTPATIENT MED LIST: ICD-10-PCS | Mod: CPTII,S$GLB,, | Performed by: INTERNAL MEDICINE

## 2023-02-17 PROCEDURE — 3074F SYST BP LT 130 MM HG: CPT | Mod: CPTII,S$GLB,, | Performed by: INTERNAL MEDICINE

## 2023-02-17 PROCEDURE — 3078F PR MOST RECENT DIASTOLIC BLOOD PRESSURE < 80 MM HG: ICD-10-PCS | Mod: CPTII,S$GLB,, | Performed by: INTERNAL MEDICINE

## 2023-02-17 PROCEDURE — 1159F MED LIST DOCD IN RCRD: CPT | Mod: CPTII,S$GLB,, | Performed by: INTERNAL MEDICINE

## 2023-02-17 PROCEDURE — 3008F PR BODY MASS INDEX (BMI) DOCUMENTED: ICD-10-PCS | Mod: CPTII,S$GLB,, | Performed by: INTERNAL MEDICINE

## 2023-02-17 PROCEDURE — 3078F DIAST BP <80 MM HG: CPT | Mod: CPTII,S$GLB,, | Performed by: INTERNAL MEDICINE

## 2023-02-17 PROCEDURE — 1111F DSCHRG MED/CURRENT MED MERGE: CPT | Mod: CPTII,S$GLB,, | Performed by: INTERNAL MEDICINE

## 2023-02-17 PROCEDURE — 1159F PR MEDICATION LIST DOCUMENTED IN MEDICAL RECORD: ICD-10-PCS | Mod: CPTII,S$GLB,, | Performed by: INTERNAL MEDICINE

## 2023-02-17 NOTE — PROGRESS NOTES
"HOSPITAL FOLLOWUP NOTE    Discharge note information (in italics) was reviewed in detail and discussed with the patient:   Mr. Mccrary is a 47 year old male with history of epilepsy here with increased seizure like events.  cEEG monitoring recorded multiple events which were nonepileptic in nature, however concurrent epilepsy is still possible and we recommend discharging patient on lamotrigine 250 mg BID, zonisamide 100 mg BID.  In terms of patient's klonopin, he strongly desires to be off the medication as soon as possible.  We had a prolonged conversation about the danger of stopping klonopin abruptly.  Recommend klonopin taper as outlined in resident note.  We will sign off at this time; patient to follow up with Dr. Rodriguez after discharge.      PMFSH: all information reviewed and updated as necessary during this encounter.  Medication list reviewed and reconciled.    He is feeling well today and has no particular complaints. He had some "fuzziness" the other day but no active seizures.  He is already seeing Dr. Rodriguez. He is tapering his clonazapam as directed.      Review of Systems   All other systems reviewed and are negative.  Physical Exam  Vitals reviewed.   Constitutional:       General: He is not in acute distress.     Appearance: Normal appearance. He is normal weight. He is not ill-appearing, toxic-appearing or diaphoretic.   Neurological:      Mental Status: He is alert.      Assessment/plan:  1. Hospital discharge follow-up    2. Seizure disorder  Stable. F/u with epilepsy clinic as planned. Continue meds per their instructions.   F/u with PCP as previously recommended.            No follow-ups on file.  Medication List with Changes/Refills   Current Medications    CLONAZEPAM (KLONOPIN) 1 MG TABLET    Take 1 mg by mouth 2 (two) times daily.    FOLIC ACID (FOLVITE) 400 MCG TABLET    Take 800 mcg by mouth once daily.    LAMOTRIGINE (LAMICTAL) 100 MG TABLET    Take 3 tablets (300 mg total) by mouth once " daily.    THIAMINE 100 MG TABLET    Take 100 mg by mouth once daily.    ZONISAMIDE (ZONEGRAN) 100 MG CAP    Take 100 mg by mouth 2 (two) times daily.          I spent a total of 30 minutes on the day of the visit.This includes face to face time and non-face to face time preparing to see the patient (eg, review of tests), obtaining and/or reviewing separately obtained history, documenting clinical information in the electronic or other health record, independently interpreting results and communicating results to the patient/family/caregiver, and coordinating care.

## 2023-02-24 ENCOUNTER — OFFICE VISIT (OUTPATIENT)
Dept: NEUROLOGY | Facility: CLINIC | Age: 48
End: 2023-02-24
Payer: COMMERCIAL

## 2023-02-24 VITALS — HEART RATE: 72 BPM | SYSTOLIC BLOOD PRESSURE: 133 MMHG | DIASTOLIC BLOOD PRESSURE: 75 MMHG

## 2023-02-24 DIAGNOSIS — G40.909 SEIZURE DISORDER: Primary | ICD-10-CM

## 2023-02-24 PROCEDURE — 1159F PR MEDICATION LIST DOCUMENTED IN MEDICAL RECORD: ICD-10-PCS | Mod: CPTII,S$GLB,, | Performed by: STUDENT IN AN ORGANIZED HEALTH CARE EDUCATION/TRAINING PROGRAM

## 2023-02-24 PROCEDURE — 1159F MED LIST DOCD IN RCRD: CPT | Mod: CPTII,S$GLB,, | Performed by: STUDENT IN AN ORGANIZED HEALTH CARE EDUCATION/TRAINING PROGRAM

## 2023-02-24 PROCEDURE — 3078F PR MOST RECENT DIASTOLIC BLOOD PRESSURE < 80 MM HG: ICD-10-PCS | Mod: CPTII,S$GLB,, | Performed by: STUDENT IN AN ORGANIZED HEALTH CARE EDUCATION/TRAINING PROGRAM

## 2023-02-24 PROCEDURE — 1111F PR DISCHARGE MEDS RECONCILED W/ CURRENT OUTPATIENT MED LIST: ICD-10-PCS | Mod: CPTII,S$GLB,, | Performed by: STUDENT IN AN ORGANIZED HEALTH CARE EDUCATION/TRAINING PROGRAM

## 2023-02-24 PROCEDURE — 3078F DIAST BP <80 MM HG: CPT | Mod: CPTII,S$GLB,, | Performed by: STUDENT IN AN ORGANIZED HEALTH CARE EDUCATION/TRAINING PROGRAM

## 2023-02-24 PROCEDURE — 1111F DSCHRG MED/CURRENT MED MERGE: CPT | Mod: CPTII,S$GLB,, | Performed by: STUDENT IN AN ORGANIZED HEALTH CARE EDUCATION/TRAINING PROGRAM

## 2023-02-24 PROCEDURE — 99999 PR PBB SHADOW E&M-EST. PATIENT-LVL III: CPT | Mod: PBBFAC,,, | Performed by: STUDENT IN AN ORGANIZED HEALTH CARE EDUCATION/TRAINING PROGRAM

## 2023-02-24 PROCEDURE — 99214 OFFICE O/P EST MOD 30 MIN: CPT | Mod: S$GLB,,, | Performed by: STUDENT IN AN ORGANIZED HEALTH CARE EDUCATION/TRAINING PROGRAM

## 2023-02-24 PROCEDURE — 3075F SYST BP GE 130 - 139MM HG: CPT | Mod: CPTII,S$GLB,, | Performed by: STUDENT IN AN ORGANIZED HEALTH CARE EDUCATION/TRAINING PROGRAM

## 2023-02-24 PROCEDURE — 3075F PR MOST RECENT SYSTOLIC BLOOD PRESS GE 130-139MM HG: ICD-10-PCS | Mod: CPTII,S$GLB,, | Performed by: STUDENT IN AN ORGANIZED HEALTH CARE EDUCATION/TRAINING PROGRAM

## 2023-02-24 PROCEDURE — 99999 PR PBB SHADOW E&M-EST. PATIENT-LVL III: ICD-10-PCS | Mod: PBBFAC,,, | Performed by: STUDENT IN AN ORGANIZED HEALTH CARE EDUCATION/TRAINING PROGRAM

## 2023-02-24 PROCEDURE — 99214 PR OFFICE/OUTPT VISIT, EST, LEVL IV, 30-39 MIN: ICD-10-PCS | Mod: S$GLB,,, | Performed by: STUDENT IN AN ORGANIZED HEALTH CARE EDUCATION/TRAINING PROGRAM

## 2023-02-24 NOTE — PROGRESS NOTES
[facility] - NEUROLOGY 7TH [place] [facility], [place]    Date: 2/24/23  Patient Name: Pal Mccrary   MRN: [id number]   PCP: Behram Khan (Inactive)  Referring Provider: No ref. provider found    Assessment:   Pal Mccrary is a 47 y.o. male presenting to clinic as hospital follow up for epilepsy. See [facility] for full detail. Scheduled to see Dr Rodriguez to establish care in April. Patient without events concerning for seizure since last visit. Feels much better on current AED regimen, with regard to gait, balance, mood, and cognition. Patient without other concerns currently. Will follow up with Dr Rodriguez in April and message with any questions, concerns, or need for refills in interim. Further workup/management of epilepsy with PNEs overlay in future with Dr Rodriguez.    Plan:     Continue Lamictal 250 BID  Continue Zonisamide 100 BID  Cotninue klonipin taper as scheduled; 0.5mg BID for one month followed by 0.5mg daily for one month before completely stopping medication  Reviewed seizure hygiene and precautions including not driving for 6 months from time of last event concerning for seizure, water, and heights.  Reviewed causes for patient to return to ED including seizure >5 minutes or >1 event in 24 hours  Patient voiced understanding and agreement with the plan as outlined  Patient to follow up with Dr Rodriguez in April for epilepsy management  Patient to message or call with any questions, concerns, or need for medication refill in interim    Problem List Items Addressed This Visit          Neuro    Seizure disorder - Primary       Darwin Cantu MD    Patient note was created using MModal Dictation.  Any errors in syntax or even information may not have been identified and edited on initial review prior to signing this note.  Subjective:   Patient seen in consultation at the request of No ref. provider found for the evaluation of Epilepsy. A copy of this note will be sent to the  "referring physician.        HPI:   Mr. Pal Mccrary is a 47 y.o. male with epilepsy (previously seen by Dr Hernández in outside facility) who is presenting to clinic to establish care after a recent hospitalization. The patient had recently presented to [facility] ED on 2/8 c/o increased seizure frequency. The patient, at that time, had been taking Lamictal 300 BID, Zonegran 11 BID, and Klonipin 1mg BID. He had, of note, previously taken Keppra and Vimpat but had side effects to both. The patient, at that time, had stated that he had seizures for "many years" and has a family history of epilepsy. The patient states that he had nearly a year of seizure freedom before having exacerbation of seizure frequency in July. Wife, at bedside, states that it has been like "a rollercoaster" since that time with waxing and waning seizure frequency. The patient has had GTCs in the past but has only had "staring spells and loss of time" since July. The patient reports that he has also had diffulties with ambulation and balance in the 2 weeks prior to admission as well. Patient uses a cane at baseline for balance. Over the course of his admission, the patient had several events, captured on EEG, which were nonepileptic in nature. Given uncertainty of epilepsy diagnosis, the patient was maintained on Zonisamide 100 BID, however his Lamictal was decreased to 250 BID due to concern of toxicity causing side effects including some of his symptoms at the time of presentation. Patient was ultimately discharged on Lamictal 250 BID, zonisamide 100 BID and on a klonipin taper (0.5 mg BID for one month followed by 0.5 pill daily for one month).    Since time of discharge, the patient has not had any events concerning for seizure. He reports significant improvement with cognition and ambulation/balance since time of discharge and reported being in a better mood. Reported compliance with his medication regimen, including the taper. He stated he had " enough of all medications and voiced no other concerns at the time of visit.    See reports for full EEG findings.  Current AEDs: Lamictal 250 BID, zonisamide 100 BID and on a klonipin taper (0.5 mg BID for one month followed by 0.5 pill daily for one month).  Past tried AEDs: Keppra and Vimpat (Side effects to both)  Family history of epilepsy.     PAST MEDICAL HISTORY:  Past Medical History:   Diagnosis Date    Epilepsy     Seizures        PAST SURGICAL HISTORY:  No past surgical history on file.    CURRENT MEDS:  Current Outpatient Medications   Medication Sig Dispense Refill    clonazePAM (KLONOPIN) 1 MG tablet Take 1 mg by mouth 2 (two) times daily.      folic acid (FOLVITE) 400 MCG tablet Take 800 mcg by mouth once daily.      thiamine 100 MG tablet Take 100 mg by mouth once daily.      zonisamide (ZONEGRAN) 100 MG Cap Take 100 mg by mouth 2 (two) times daily.      lamoTRIgine (LAMICTAL) 100 MG tablet Take 3 tablets (300 mg total) by mouth once daily. (Patient taking differently: Take 300 mg by mouth 2 (two) times daily.) 90 tablet 1     No current facility-administered medications for this visit.       ALLERGIES:  Review of patient's allergies indicates:   Allergen Reactions    Ibuprofen     Shellfish containing products        FAMILY HISTORY:  Family History   Problem Relation Age of Onset    Heart disease Father        SOCIAL HISTORY:  Social History     Tobacco Use    Smoking status: Former     Packs/day: 1.50     Years: 24.00     Pack years: 36.00     Types: Vaping with nicotine, Cigarettes    Smokeless tobacco: Current   Substance Use Topics    Alcohol use: Yes     Alcohol/week: 4.0 standard drinks     Types: 4 Cans of beer per week    Drug use: Never       Review of Systems:  12 system review of systems is negative except for the symptoms mentioned in HPI.      Objective:     Vitals:    02/24/23 1033   BP: 133/75   Pulse: 72     General: NAD, well nourished   Eyes: no tearing, discharge, no erythema    ENT: moist mucous membranes of the oral cavity, nares patent    Neck: Supple, full range of motion  Cardiovascular: Warm and well perfused, pulses equal and symmetrical  Lungs: Normal work of breathing, normal chest wall excursions  Skin: No rash, lesions, or breakdown on exposed skin  Psychiatry: Mood and affect are appropriate   Abdomen: soft, non tender, non distended  Extremeties: No cyanosis, clubbing or edema.    Neurological   MENTAL STATUS: Alert and oriented to person, place, and time. Attention and concentration within normal limits. Speech without dysarthria, able to name and repeat without difficulty. Recent and remote memory within normal limits   CRANIAL NERVES: Visual fields intact. PERRL. EOMI. Facial sensation intact. Face symmetrical. Hearing grossly intact. Full shoulder shrug bilaterally. Tongue protrudes midline   SENSORY: Sensation is intact to light touch throughout.  Joint position perception intact. Negative Romberg.   MOTOR: Normal bulk and tone. No pronator drift.  5/5 deltoid, biceps, triceps, interosseous, hand  bilaterally. 5/5 iliopsoas, knee extension/flexion, foot dorsi/plantarflexion bilaterally.    REFLEXES: Symmetric and 2+ throughout. Toes down going bilaterally.   CEREBELLAR/COORDINATION/GAIT: Gait steady with caneFinger to nose intact. Normal rapid alternating movements.

## 2023-02-25 NOTE — DISCHARGE SUMMARY
DISCHARGE SUMMARY  Hospital Medicine    Team: [facility]    Patient Name: Pal Mccrary  YOB: 1975    Admit Date: 2/8/2023    Discharge Date: 2/10/2023    Discharge Attending Physician: Meghan Garay     Admitting Resident    Diagnoses:  Active Hospital Problems    Diagnosis  POA    Seizure disorder [G40.909]  Unknown    Gait instability [R26.81]  Unknown    Epilepsy [G40.909]  Yes    Anxiety [F41.9]  Yes      Resolved Hospital Problems    Diagnosis Date Resolved POA    *Breakthrough seizure [G40.919] 02/17/2023 Yes    Amnesia [R41.3] 02/17/2023 Yes       Discharged Condition: admit problems have stabilized     HOSPITAL COURSE:      Initial Presentation:    Patient is a 47-year-old male past medical history of absence seizures followed by [facility] presenting today for seizures.  Patient states for the past 4-5 days he has had increasing episodes of absence seizures.  The wife has witnessed the increasing episodes and reports they typically last approximately 30 seconds to 1 minute. The patient does not have recollection of them occurring. He also reports ataxia, now requiring a cane over the past several days as well.  No numbness or tingling in extremities, no weakness. He endorses a diffuse headache. Denies recent illness, fevers or chills, confusion, lethargy, slurred speech, falls, LOC, CP, SOB, abdominal pain, N/V. Patient called his neurologist this morning and he was referred to the ED for further evaluation.  Patient states he has a follow-up appoint with Dr. Rodriguez in March but has not established care with [facility] yet..  He is currently on Lamictal and zonegran and endorses compliance.      In the ED: HR elevated to 105 initially, but now stable ~80 bpm. Otherwise VSS. AF without leukocytosis. Labs grossly unremarkable. UA noninfectious. CTH without acute intracranial abnormality. Given 1L LR bolus. Evaluated by neuro in ED, will follow.        Course of Principle Problem for  Admission:    cEEG monitoring recorded multiple events which were nonepileptic in nature, however concurrent epilepsy is still possible and we recommend discharging patient on lamotrigine 250 mg BID, zonisamide 100 mg BID.  In terms of patient's klonopin, he strongly desires to be off the medication as soon as possible.  We had a prolonged conversation about the danger of stopping klonopin abruptly.  Recommend klonopin taper    CONSULTS: epilepsy    Last CBC/BMP/HgbA1c (if applicable):  No results found for this or any previous visit (from the past 336 hour(s)).  No results found for this or any previous visit (from the past 336 hour(s)).  Lab Results   Component Value Date    HGBA1C 5.4 10/18/2019       Disposition:  Home       Future Scheduled Appointments:  No future appointments.    Follow-up Plans from This Hospitalization:  epilepsy    Discharge Medication List:        Medication List        CHANGE how you take these medications      lamoTRIgine 100 MG tablet  Commonly known as: LAMICTAL  Take 3 tablets (300 mg total) by mouth once daily.  What changed: when to take this            CONTINUE taking these medications      clonazePAM 1 MG tablet  Commonly known as: KlonoPIN     folic acid 400 MCG tablet  Commonly known as: FOLVITE     thiamine 100 MG tablet     zonisamide 100 MG Cap  Commonly known as: ZONEGRAN               Patient Instructions:  Discharge Procedure Orders   Ambulatory referral/consult to Neurology Epilepsy   Standing Status: Future   Referral Priority: Routine Referral Type: Consultation   Referral Reason: Specialty Services Required   Requested Specialty: Neurology   Number of Visits Requested: 1       Signing Physician:  Meghan Garay MD

## 2023-04-05 ENCOUNTER — PATIENT MESSAGE (OUTPATIENT)
Dept: NEUROLOGY | Facility: CLINIC | Age: 48
End: 2023-04-05
Payer: COMMERCIAL

## 2023-04-17 ENCOUNTER — OFFICE VISIT (OUTPATIENT)
Dept: NEUROLOGY | Facility: CLINIC | Age: 48
End: 2023-04-17
Payer: COMMERCIAL

## 2023-04-17 ENCOUNTER — LAB VISIT (OUTPATIENT)
Dept: LAB | Facility: HOSPITAL | Age: 48
End: 2023-04-17
Attending: PSYCHIATRY & NEUROLOGY
Payer: COMMERCIAL

## 2023-04-17 VITALS
HEIGHT: 66 IN | HEART RATE: 83 BPM | DIASTOLIC BLOOD PRESSURE: 71 MMHG | SYSTOLIC BLOOD PRESSURE: 121 MMHG | BODY MASS INDEX: 21.95 KG/M2

## 2023-04-17 DIAGNOSIS — G40.209 COMPLEX PARTIAL SEIZURES EVOLVING TO GENERALIZED TONIC-CLONIC SEIZURES: Primary | ICD-10-CM

## 2023-04-17 DIAGNOSIS — F44.5 FUNCTIONAL NEUROLOGICAL SYMPTOM DISORDER WITH ATTACKS OR SEIZURES: ICD-10-CM

## 2023-04-17 DIAGNOSIS — R26.81 GAIT INSTABILITY: ICD-10-CM

## 2023-04-17 DIAGNOSIS — G40.209 COMPLEX PARTIAL SEIZURES EVOLVING TO GENERALIZED TONIC-CLONIC SEIZURES: ICD-10-CM

## 2023-04-17 DIAGNOSIS — F41.9 ANXIETY: ICD-10-CM

## 2023-04-17 PROCEDURE — 1159F PR MEDICATION LIST DOCUMENTED IN MEDICAL RECORD: ICD-10-PCS | Mod: CPTII,S$GLB,, | Performed by: PSYCHIATRY & NEUROLOGY

## 2023-04-17 PROCEDURE — 80203 DRUG SCREEN QUANT ZONISAMIDE: CPT | Performed by: PSYCHIATRY & NEUROLOGY

## 2023-04-17 PROCEDURE — 3078F PR MOST RECENT DIASTOLIC BLOOD PRESSURE < 80 MM HG: ICD-10-PCS | Mod: CPTII,S$GLB,, | Performed by: PSYCHIATRY & NEUROLOGY

## 2023-04-17 PROCEDURE — 99999 PR PBB SHADOW E&M-EST. PATIENT-LVL III: CPT | Mod: PBBFAC,,, | Performed by: PSYCHIATRY & NEUROLOGY

## 2023-04-17 PROCEDURE — 1160F PR REVIEW ALL MEDS BY PRESCRIBER/CLIN PHARMACIST DOCUMENTED: ICD-10-PCS | Mod: CPTII,S$GLB,, | Performed by: PSYCHIATRY & NEUROLOGY

## 2023-04-17 PROCEDURE — 3008F PR BODY MASS INDEX (BMI) DOCUMENTED: ICD-10-PCS | Mod: CPTII,S$GLB,, | Performed by: PSYCHIATRY & NEUROLOGY

## 2023-04-17 PROCEDURE — 3078F DIAST BP <80 MM HG: CPT | Mod: CPTII,S$GLB,, | Performed by: PSYCHIATRY & NEUROLOGY

## 2023-04-17 PROCEDURE — 1159F MED LIST DOCD IN RCRD: CPT | Mod: CPTII,S$GLB,, | Performed by: PSYCHIATRY & NEUROLOGY

## 2023-04-17 PROCEDURE — 99215 OFFICE O/P EST HI 40 MIN: CPT | Mod: S$GLB,,, | Performed by: PSYCHIATRY & NEUROLOGY

## 2023-04-17 PROCEDURE — 1160F RVW MEDS BY RX/DR IN RCRD: CPT | Mod: CPTII,S$GLB,, | Performed by: PSYCHIATRY & NEUROLOGY

## 2023-04-17 PROCEDURE — 99999 PR PBB SHADOW E&M-EST. PATIENT-LVL III: ICD-10-PCS | Mod: PBBFAC,,, | Performed by: PSYCHIATRY & NEUROLOGY

## 2023-04-17 PROCEDURE — 3074F SYST BP LT 130 MM HG: CPT | Mod: CPTII,S$GLB,, | Performed by: PSYCHIATRY & NEUROLOGY

## 2023-04-17 PROCEDURE — 80175 DRUG SCREEN QUAN LAMOTRIGINE: CPT | Performed by: PSYCHIATRY & NEUROLOGY

## 2023-04-17 PROCEDURE — 99215 PR OFFICE/OUTPT VISIT, EST, LEVL V, 40-54 MIN: ICD-10-PCS | Mod: S$GLB,,, | Performed by: PSYCHIATRY & NEUROLOGY

## 2023-04-17 PROCEDURE — 3008F BODY MASS INDEX DOCD: CPT | Mod: CPTII,S$GLB,, | Performed by: PSYCHIATRY & NEUROLOGY

## 2023-04-17 PROCEDURE — 3074F PR MOST RECENT SYSTOLIC BLOOD PRESSURE < 130 MM HG: ICD-10-PCS | Mod: CPTII,S$GLB,, | Performed by: PSYCHIATRY & NEUROLOGY

## 2023-04-17 RX ORDER — LAMOTRIGINE 250 MG/1
250 TABLET, EXTENDED RELEASE ORAL 2 TIMES DAILY
Qty: 180 TABLET | Refills: 3 | Status: SHIPPED | OUTPATIENT
Start: 2023-04-17 | End: 2023-10-25

## 2023-04-17 RX ORDER — ZONISAMIDE 100 MG/1
100 CAPSULE ORAL 2 TIMES DAILY
Qty: 180 CAPSULE | Refills: 3 | Status: SHIPPED | OUTPATIENT
Start: 2023-04-17 | End: 2023-10-25

## 2023-04-17 NOTE — PATIENT INSTRUCTIONS
You came to Epilepsy Clinic because of your seizure disorder.  Your seizures are mostly well controlled on Lamotrigine 250 mg twice daily and Zonisamide 100mg twice daily. Please continue the same medications at the same dose. Please get a dexa scan to look for any evidence of bone breakdown. If you start to have too many events at home, I would like you to get an outpatient EEG to look at the electrical firing of your brain. You can wear an ambulatory EEG at home to try to capture events where you are most comfortable. Please call [phone] to schedule this.  Zonisamide is associated with decreased appetite and weight loss. Please work on a daily menu to make sure you are taking in adequate calories. We can track your weight over visits. If your weight decreases enough, we will need to consider stopping the zonisamide.     Do not miss any doses of medication. If a dose of medication is missed, take it as soon as it is remembered even if that means doubling up on the dose. Please get a lab test to check out the blood level of medication. Get regular sleep. Go to sleep at the same time and wake up at the same time every day. People with epilepsy require more sleep than people without epilepsy.  Sleeping 10-12 hours a day can be normal for a person with epilepsy.  Every seizure makes it harder to prevent the next seizure. Epilepsy is associated with SUDEP, or sudden unexpected death in epilepsy.  The risk is significantly higher if convulsive seizures are not well controlled. For more information, check out these websites: https://www.epilepsy.com/, https://www.epilepsyallianceamerica.org/, www.sarah-epilepsy.org.      You have many risk factors for a functional neurological disorder.  In this type of disorder, the nervous system has difficulty interpreting external stimuli and can easily become overstimulated.  This often occurs in bright, loud, or emotionally charged situations.  Usually patients have insight that  an episode may be about to happen.  When you feel an event coming on, please go to a dark, quiet place with no other people to let your system reset.  Some people only need 5 minutes to get back to baseline while others may need several hours or some sleep.  There is a very good book that discusses this diagnosis in detail:  The Body Keeps the Score by Josue Husain.  Although not perfect, it may provide some insight into what is going on.  I would love to hear what you think about what the author has to say. Please take a look and let me know your thoughts (good or bad, agreeing or disagreeing) over the portal.  If you continue to have events, the next step will be to admit you to the epilepsy monitoring unit to capture events in order to better understand what is going on so we can come up with the best treatment plan for you.     Per [place] law, no episodes of loss of consciousness for 6 months before driving.  Avoid dangerous situations.  For example, no baths/pools alone, no heights, no power tools.  Wear a bike helmet.  If breakthrough seizures occur that are different in character, frequency, or duration from normal episodes, please patient portal me or call the office and we will decide the next steps. If multiple seizures occur in a row without return back to baseline, 911 needs to be called.     Return to clinic in 6 months or sooner with issues.  Please patient portal with any questions or concerns.    Maggie Rodriguez MD PhD [facility]  Neurology-Epilepsy  [facility].    Forms/Letters/Disability/DMV Paperwork: We understand the importance of filling out forms and providing letters in a timely manner.  However, many of these forms have very tricky language and once an official form is submitted as part of the medical record, it can not be modified or erased.  Please work with us in order to get these forms filled out in the most complete, accurate, and efficient way. 1.  Once you are  aware that a form will need to be completed, please make an appointment.  A virtual appointment with Dr. Rodriguez or Tiffany is perfectly fine. 2.  Please fill out the form as much as you can.  There are many questions that we do not have an answer for.  Please bring a blank copy of the form and your partially filled out form to the clinic visit or send them to us over the portal.  We will complete the form together with you during the clinic visit, sign it, and either return it to you or send it to the correct destination.  Every form will require an appointment however we can fill out multiple forms at once if needed.  Please do not hesitate to reach out with any questions or concerns about this policy.  We are trying to make sure that we have a system in place to meet this need which works for everyone involved.  Thank you for your understanding.
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
## 2023-04-18 ENCOUNTER — PATIENT MESSAGE (OUTPATIENT)
Dept: NEUROLOGY | Facility: CLINIC | Age: 48
End: 2023-04-18
Payer: COMMERCIAL

## 2023-04-19 LAB — ZONISAMIDE SERPL-MCNC: 9.2 MCG/ML (ref 10–40)

## 2023-04-20 ENCOUNTER — PATIENT MESSAGE (OUTPATIENT)
Dept: NEUROLOGY | Facility: CLINIC | Age: 48
End: 2023-04-20
Payer: COMMERCIAL

## 2023-04-20 LAB — LAMOTRIGINE SERPL-MCNC: 9.2 UG/ML (ref 2–15)

## 2023-05-09 ENCOUNTER — PATIENT MESSAGE (OUTPATIENT)
Dept: NEUROLOGY | Facility: CLINIC | Age: 48
End: 2023-05-09
Payer: COMMERCIAL

## 2023-05-31 ENCOUNTER — PATIENT MESSAGE (OUTPATIENT)
Dept: NEUROLOGY | Facility: CLINIC | Age: 48
End: 2023-05-31
Payer: COMMERCIAL

## 2023-07-06 ENCOUNTER — TELEPHONE (OUTPATIENT)
Dept: NEUROLOGY | Facility: CLINIC | Age: 48
End: 2023-07-06
Payer: COMMERCIAL

## 2023-07-06 ENCOUNTER — PATIENT MESSAGE (OUTPATIENT)
Dept: NEUROLOGY | Facility: CLINIC | Age: 48
End: 2023-07-06
Payer: COMMERCIAL

## 2023-07-06 DIAGNOSIS — F44.5 FUNCTIONAL NEUROLOGICAL SYMPTOM DISORDER WITH ATTACKS OR SEIZURES: Primary | ICD-10-CM

## 2023-07-06 NOTE — TELEPHONE ENCOUNTER
Therapy referrals    Maggie Rodriguez MD PhD [facility]  Neurology-Epilepsy  [facility].

## 2023-07-07 NOTE — TELEPHONE ENCOUNTER
Question answered via portal messaging.     Maggie Rodriguez MD PhD [facility]  Neurology-Epilepsy  [facility].      ----- Message from Marielle Barreto sent at 7/6/2023  2:13 PM CDT -----  Regarding: Patient Advice  Contact: Pt [phone]  Pt is calling to speak to provider please call

## 2023-09-21 ENCOUNTER — PATIENT MESSAGE (OUTPATIENT)
Dept: NEUROLOGY | Facility: CLINIC | Age: 48
End: 2023-09-21
Payer: COMMERCIAL

## 2023-10-25 ENCOUNTER — OFFICE VISIT (OUTPATIENT)
Dept: NEUROLOGY | Facility: CLINIC | Age: 48
End: 2023-10-25
Payer: COMMERCIAL

## 2023-10-25 VITALS
HEIGHT: 66 IN | WEIGHT: 131.06 LBS | HEART RATE: 81 BPM | SYSTOLIC BLOOD PRESSURE: 110 MMHG | BODY MASS INDEX: 21.06 KG/M2 | DIASTOLIC BLOOD PRESSURE: 72 MMHG

## 2023-10-25 DIAGNOSIS — F41.9 ANXIETY: ICD-10-CM

## 2023-10-25 DIAGNOSIS — G40.209 COMPLEX PARTIAL SEIZURES EVOLVING TO GENERALIZED TONIC-CLONIC SEIZURES: ICD-10-CM

## 2023-10-25 DIAGNOSIS — G47.00 INSOMNIA, UNSPECIFIED TYPE: ICD-10-CM

## 2023-10-25 DIAGNOSIS — F44.5 FUNCTIONAL NEUROLOGICAL SYMPTOM DISORDER WITH ATTACKS OR SEIZURES: Primary | ICD-10-CM

## 2023-10-25 DIAGNOSIS — R41.3 MEMORY DIFFICULTIES: ICD-10-CM

## 2023-10-25 PROCEDURE — 99999 PR PBB SHADOW E&M-EST. PATIENT-LVL IV: ICD-10-PCS | Mod: PBBFAC,,,

## 2023-10-25 PROCEDURE — 99215 OFFICE O/P EST HI 40 MIN: CPT | Mod: S$GLB,,,

## 2023-10-25 PROCEDURE — 99215 PR OFFICE/OUTPT VISIT, EST, LEVL V, 40-54 MIN: ICD-10-PCS | Mod: S$GLB,,,

## 2023-10-25 PROCEDURE — 3008F BODY MASS INDEX DOCD: CPT | Mod: CPTII,S$GLB,,

## 2023-10-25 PROCEDURE — 3074F PR MOST RECENT SYSTOLIC BLOOD PRESSURE < 130 MM HG: ICD-10-PCS | Mod: CPTII,S$GLB,,

## 2023-10-25 PROCEDURE — 3074F SYST BP LT 130 MM HG: CPT | Mod: CPTII,S$GLB,,

## 2023-10-25 PROCEDURE — 3078F DIAST BP <80 MM HG: CPT | Mod: CPTII,S$GLB,,

## 2023-10-25 PROCEDURE — 3078F PR MOST RECENT DIASTOLIC BLOOD PRESSURE < 80 MM HG: ICD-10-PCS | Mod: CPTII,S$GLB,,

## 2023-10-25 PROCEDURE — 1159F MED LIST DOCD IN RCRD: CPT | Mod: CPTII,S$GLB,,

## 2023-10-25 PROCEDURE — 3008F PR BODY MASS INDEX (BMI) DOCUMENTED: ICD-10-PCS | Mod: CPTII,S$GLB,,

## 2023-10-25 PROCEDURE — 1159F PR MEDICATION LIST DOCUMENTED IN MEDICAL RECORD: ICD-10-PCS | Mod: CPTII,S$GLB,,

## 2023-10-25 PROCEDURE — 99999 PR PBB SHADOW E&M-EST. PATIENT-LVL IV: CPT | Mod: PBBFAC,,,

## 2023-10-25 RX ORDER — LAMOTRIGINE 250 MG/1
250 TABLET, EXTENDED RELEASE ORAL NIGHTLY
Qty: 180 TABLET | Refills: 3 | Status: SHIPPED | OUTPATIENT
Start: 2023-10-25 | End: 2024-10-19

## 2023-10-25 RX ORDER — CLONAZEPAM 1 MG/1
0.5 TABLET ORAL NIGHTLY
Qty: 40 TABLET | Refills: 1 | Status: SHIPPED | OUTPATIENT
Start: 2023-10-25

## 2023-10-25 RX ORDER — ZONISAMIDE 100 MG/1
200 CAPSULE ORAL NIGHTLY
Qty: 180 CAPSULE | Refills: 3
Start: 2023-10-25 | End: 2024-10-25
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
## 2023-10-25 NOTE — PATIENT INSTRUCTIONS
You came to Epilepsy Clinic because of your seizure disorder as well as a functional neurologic disorder.      In a functional neurologic disorder, the nervous system has difficulty interpreting external stimuli and can easily become overstimulated. This often occurs in bright, loud, or emotionally charged situations. Usually patients have insight that an episode may be about to happen.  When you feel an event coming on, please go to a dark, quiet place with no other people to let your system reset.  Some people only need 5 minutes to get back to baseline while others may need several hours or some sleep.  There is a very good book that discusses this diagnosis in detail:  The Body Keeps the Score by Josue Husain.  Although not perfect, it may provide some insight into what is going on.  I would love to hear what you think about what the author has to say. Please take a look and let me know your thoughts (good or bad, agreeing or disagreeing) over the portal.     This is the plan we discussed today:  - we will decrease the lamotrigine to just 1 tablet (250mg) nightly  - continue zonisamide 200mg nightly  - I would like you to get an outpatient EEG to look at the electrical firing of your brain. You can wear an ambulatory EEG at home to try to capture events where you are most comfortable. Please call [phone] to schedule this.    - I have placed a referral for an evaluation by our neurocognitive team. Please call [phone] during normal working hours and ask for Gertrudis Redmond in order to schedule this.   - Sleep is incredibly important. To further evaluate your insomnia, we placed a referral to sleep medicine. Please call [facility] main line ([phone]) and ask them to transfer you to the sleep medicine clinic. Let them know your doctor placed a referral and you are trying to schedule an appointment. Let me know if you have any issues.     Do not miss any doses of medication. If a dose of medication  is missed, take it as soon as it is remembered even if that means doubling up on the dose. Get regular sleep. Go to sleep at the same time and wake up at the same time every day. People with epilepsy require more sleep than people without epilepsy.  Sleeping 10-12 hours a day can be normal for a person with epilepsy.  Every seizure makes it harder to prevent the next seizure. Epilepsy is associated with SUDEP, or sudden unexpected death in epilepsy.  The risk is significantly higher if convulsive seizures are not well controlled. For more information, check out these websites: https://www.epilepsy.com/, https://www.epilepsyallianceamerica.org/, www.sarah-epilepsy.org.       Per [place] law, no episodes of loss of consciousness for 6 months before driving.  Avoid dangerous situations.  For example, no baths/pools alone, no heights, no power tools.  Wear a bike helmet.  If breakthrough seizures occur that are different in character, frequency, or duration from normal episodes, please patient portal me or call the office and we will decide the next steps. If multiple seizures occur in a row without return back to baseline, 911 needs to be called.      Return to clinic in 3 months or sooner with issues.  Please patient portal with any questions or concerns.     Lili Chacko PA-C   Neurology-Epilepsy  [facility]

## 2023-10-30 ENCOUNTER — PATIENT MESSAGE (OUTPATIENT)
Dept: NEUROLOGY | Facility: CLINIC | Age: 48
End: 2023-10-30
Payer: COMMERCIAL

## 2023-10-30 NOTE — LETTER
November 3, 2023      [facility] [address]  [address], [address]  [facility] [address]  Phone: [phone]  Fax: [phone]       Patient: Pal Mccrary   YOB: 1975  Date of Visit: 11/03/2023    To Whom It May Concern:    Yamile Mccrary is under my care for a neurological condition that is not under control at this time.  Please excuse him from jury duty responsibilities.  There is no way he can safely participate in jury duty at this time because of frequent breakthrough seizure episodes and significant side effects including cognitive slowing to multiple medications used to help manage his seizure events.  We appreciate your assistance with this matter.  Please do not hesitate to reach out to me with any questions or concerns.    Sincerely,      Maggie Rodriguez MD PhD [facility]  Neurology-Epilepsy  [facility].

## 2023-10-31 ENCOUNTER — PATIENT MESSAGE (OUTPATIENT)
Dept: NEUROLOGY | Facility: CLINIC | Age: 48
End: 2023-10-31
Payer: COMMERCIAL

## 2023-10-31 ENCOUNTER — TELEPHONE (OUTPATIENT)
Dept: SLEEP MEDICINE | Facility: CLINIC | Age: 48
End: 2023-10-31
Payer: COMMERCIAL

## 2023-10-31 NOTE — TELEPHONE ENCOUNTER
Staff left a voice message informing Patient that staff would like to schedule an appointment for their referral.

## 2023-11-03 NOTE — TELEPHONE ENCOUNTER
Letter excusing him from jury duty responsibilities    Maggie Rodriguez MD PhD [facility]  Neurology-Epilepsy  [facility].

## 2023-11-13 ENCOUNTER — HOSPITAL ENCOUNTER (OUTPATIENT)
Dept: NEUROLOGY | Facility: CLINIC | Age: 48
Discharge: HOME | End: 2023-11-13
Payer: COMMERCIAL

## 2023-11-13 DIAGNOSIS — G40.209 COMPLEX PARTIAL SEIZURES EVOLVING TO GENERALIZED TONIC-CLONIC SEIZURES: ICD-10-CM

## 2023-11-13 PROCEDURE — 95723 PR EEG, W/O VIDEO, CONT RECORD, CMPLT STDY, I&R, >60<84 HRS: ICD-10-PCS | Mod: S$GLB,,, | Performed by: PSYCHIATRY & NEUROLOGY

## 2023-11-13 PROCEDURE — 95723 EEG PHY/QHP>60<84 HR W/O VID: CPT | Mod: S$GLB,,, | Performed by: PSYCHIATRY & NEUROLOGY

## 2023-11-16 ENCOUNTER — HOSPITAL ENCOUNTER (OUTPATIENT)
Dept: NEUROLOGY | Facility: CLINIC | Age: 48
Discharge: HOME | End: 2023-11-16
Payer: COMMERCIAL

## 2023-11-17 NOTE — PROCEDURES
AMBULATORY EEG REPORT    Methodology       Extended electroencephalographic recording is made while the patient is ambulatory and continuing normal daily activities.  Electrodes are placed according to the International 10-20 placement system and included T1 and T2 electrode placement.  Twenty four (24) channels of digital signal (sampling rate of 512/sec) was simultaneously recorded from the scalp including EKG and eye monitors.  Recording band pass was 0.1 to 100 hz and all data was stored digitally on the recorder.  The patient is instructed to press an event button when clinical symptoms occur and write the symptoms into a diary. Activation procedures which include photic stimulation, hyperventilation and instructing patients to perform simple task are done in selected patients.        The EEG is displayed on a monitor screen and can be reformatted into different montages for evaluation.  The entire recoding is submitted for computer assisted analysis to detect spike and electrographic seizure activity.  The entire recording is visually reviewed and the times identified by computer analysis as being spikes or seizures are reviewed again.  Compresses spectral analysis (CSA) is also performed on the activity recorded from each individual channel.  This is displayed as a power display of frequencies from 0 to 30 Hz over time.   The CSA analysis is done and displayed continuously.  This is reviewed for asymmetries in power between homologous areas of the scalp and for presence of changes in power which canbe seen when seizures occur.  Sections of suspected abnormalities on the CSA is then compared with the original EEG recording.  .     Maana software was also utilized in the review of this study.  This software suite analyzes the EEG recording in multiple domains.  Coherence and rhythmicity is computed to identify EEG sections which may contain organized seizures.  Each channel undergoes analysis to detect  "presence of spike and sharp waves which have special and morphological characteristic of epileptic activity.  The routine EEG recording is converted from spacial into frequency domain.  This is then displayed comparing homologous areas to identify areas of significant asymmetry.  Algorithm to identify non-cortically generated artifact is used to separate eye movement, EMG and other artifact from the EEG     Recording Times  Start on 11/13/23 13:23  Stop on 11/16/23 12:30  A total of 66 hours and 43 minutes of ambulatory EEG was recorded.      EEG Findings  The patient's background consists of a posteriorly dominant 10 Hz alpha rhythm, which is well-formed, modulated, and abolishes with eye opening.      Hyperventilation and photic stimulation did not produce any abnormal response    During the course of the recording, the patient is noted to be awake, subsequently becomes drowsy, and falls asleep with normal, symmetric sleep architecture seen.     There is no focal slowing.  There are no focal, lateralized, or epileptiform transients.  No electrographic seizures are seen.    Clinical events: There were 34 patient event button triggers throughout the recording. Patient did not bring event diary or know when events occurred (and which button activations might be accidental) but did report 2-3 "mini" events during which he pressed the event button. He states events included a visual aura of "colorful orbs" followed by "small" myoclonic jerks. No EEG correlate was seen during any of the events.     EKG demonstrates regular rhythm with occasional irregularity.    Interpretation  This is a normal ambulatory EEG during wakefulness and sleep.  Episodes of visual changes and jerks were reported but specific times were not noted. No seizures or epileptiform activity was seen.   "

## 2023-11-20 ENCOUNTER — TELEPHONE (OUTPATIENT)
Dept: NEUROLOGY | Facility: CLINIC | Age: 48
End: 2023-11-20
Payer: COMMERCIAL

## 2023-11-20 NOTE — TELEPHONE ENCOUNTER
Spoke to patient regarding the sores that were left on his head from the EEG. Patient states they are getting better and he has been keeping them clean. I advised the patient that this is unfortunately something that can happen and that Im sorry it happened to him. Instructed the patient to call me if he needed anything

## 2023-11-20 NOTE — TELEPHONE ENCOUNTER
----- Message from Floridalma Valera sent at 11/20/2023  2:21 PM CST -----  Regarding: RE: EEG  Contact: Pt  [phone]  I do not have any more to add. We tell patients to apply either neosporin or polysporin and let the skin breathe.    Thanks  ----- Message -----  From: Melissa Abraham RN  Sent: 11/20/2023   7:18 AM CST  To: Nancy Camacho V; Floridalma Valera; #  Subject: FW: EEG                                          This patient's ambulatory was ordered by Dr. Abad somewhere along the way it looks like. Had it [place] on 11[place]13[place]23 [place] [facility] Last saw Tiffany 10/25/23. Can someone please call him back- Anita or Jacqui- and explain if they feel anything looks infected that they should contact his PCP for evaluation and possible antibiotic. The neurology clinic would not be prescribing antibiotics.     Nancy or Floridalma, anything to add here since he is saying it's from the EEG?    Thanks!   ----- Message -----  From: Maggie Rodriguez MD PhD  Sent: 11/17/2023   8:04 PM CST  To: Nancy Camacho V; Floridalma Valera; #  Subject: FW: EEG                                          Can you give him a call please?  Do we know who placed the ambulatory?    It seems that this is happening a lot more frequently.     Maggie    ----- Message -----  From: Marielle Barreto  Sent: 11/17/2023   9:31 AM CST  To: Jennifer Serrano Staff  Subject: EEG                                              Pt is calling to states he had a ambulatory EEG and states he has cuts on his head from it and states the wife stated the look like they are infected please call

## 2023-11-27 ENCOUNTER — OFFICE VISIT (OUTPATIENT)
Dept: SLEEP MEDICINE | Facility: CLINIC | Age: 48
End: 2023-11-27
Payer: COMMERCIAL

## 2023-11-27 VITALS
HEART RATE: 89 BPM | DIASTOLIC BLOOD PRESSURE: 79 MMHG | HEIGHT: 66 IN | SYSTOLIC BLOOD PRESSURE: 108 MMHG | WEIGHT: 131.19 LBS | BODY MASS INDEX: 21.08 KG/M2

## 2023-11-27 DIAGNOSIS — G47.00 INSOMNIA, UNSPECIFIED TYPE: ICD-10-CM

## 2023-11-27 DIAGNOSIS — F51.09 OTHER INSOMNIA NOT DUE TO A SUBSTANCE OR KNOWN PHYSIOLOGICAL CONDITION: Primary | ICD-10-CM

## 2023-11-27 PROCEDURE — 1159F MED LIST DOCD IN RCRD: CPT | Mod: CPTII,S$GLB,, | Performed by: INTERNAL MEDICINE

## 2023-11-27 PROCEDURE — 99214 PR OFFICE/OUTPT VISIT, EST, LEVL IV, 30-39 MIN: ICD-10-PCS | Mod: S$GLB,,, | Performed by: INTERNAL MEDICINE

## 2023-11-27 PROCEDURE — 3078F PR MOST RECENT DIASTOLIC BLOOD PRESSURE < 80 MM HG: ICD-10-PCS | Mod: CPTII,S$GLB,, | Performed by: INTERNAL MEDICINE

## 2023-11-27 PROCEDURE — 3008F BODY MASS INDEX DOCD: CPT | Mod: CPTII,S$GLB,, | Performed by: INTERNAL MEDICINE

## 2023-11-27 PROCEDURE — 3074F SYST BP LT 130 MM HG: CPT | Mod: CPTII,S$GLB,, | Performed by: INTERNAL MEDICINE

## 2023-11-27 PROCEDURE — 3078F DIAST BP <80 MM HG: CPT | Mod: CPTII,S$GLB,, | Performed by: INTERNAL MEDICINE

## 2023-11-27 PROCEDURE — 3074F PR MOST RECENT SYSTOLIC BLOOD PRESSURE < 130 MM HG: ICD-10-PCS | Mod: CPTII,S$GLB,, | Performed by: INTERNAL MEDICINE

## 2023-11-27 PROCEDURE — 99214 OFFICE O/P EST MOD 30 MIN: CPT | Mod: S$GLB,,, | Performed by: INTERNAL MEDICINE

## 2023-11-27 PROCEDURE — 99999 PR PBB SHADOW E&M-EST. PATIENT-LVL III: ICD-10-PCS | Mod: PBBFAC,,, | Performed by: INTERNAL MEDICINE

## 2023-11-27 PROCEDURE — 3008F PR BODY MASS INDEX (BMI) DOCUMENTED: ICD-10-PCS | Mod: CPTII,S$GLB,, | Performed by: INTERNAL MEDICINE

## 2023-11-27 PROCEDURE — 1159F PR MEDICATION LIST DOCUMENTED IN MEDICAL RECORD: ICD-10-PCS | Mod: CPTII,S$GLB,, | Performed by: INTERNAL MEDICINE

## 2023-11-27 PROCEDURE — 99999 PR PBB SHADOW E&M-EST. PATIENT-LVL III: CPT | Mod: PBBFAC,,, | Performed by: INTERNAL MEDICINE

## 2023-11-27 NOTE — PROGRESS NOTES
"  Referred by Lili Chacko PA-C     NEW PATIENT VISIT    Pal Mccrary  is a pleasant 48 y.o. 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       Current Outpatient Medications:     clonazePAM (KLONOPIN) 1 MG tablet, Take 0.5 tablets (0.5 mg total) by mouth every evening., Disp: 40 tablet, Rfl: 1    folic acid (FOLVITE) 400 MCG tablet, Take 800 mcg by mouth once daily., Disp: , Rfl:     lamoTRIgine 250 mg TR24, Take 250 mg by mouth nightly., Disp: 180 tablet, Rfl: 3    thiamine 100 MG tablet, Take 100 mg by mouth once daily., Disp: , Rfl:     zonisamide (ZONEGRAN) 100 MG Cap, Take 2 capsules (200 mg total) by mouth nightly., Disp: 180 capsule, Rfl: 3       Vitals:    11/27/23 0950   BP: 108/79   BP Location: Left arm   Patient Position: Sitting   BP Method: Small (Automatic)   Pulse: 89   Weight: 59.5 kg (131 lb 2.8 oz)   Height: 5' 6" (1.676 m)     Physical Exam:    GEN:   Well-appearing  Psych:  Appropriate affect, demonstrates insight  SKIN:  No rash on the face or bridge of the nose      LABS:   No results found for: "HGB", "CO2"    RECORDS REVIEWED PREVIOUSLY:        ASSESSMENT      PROBLEM DESCRIPTION/ Sx on Presentation  STATUS   screening JOSE   occasional snoring, occasional witnessed  apneas    New   Daytime Sx   denies sleepiness when inactive   lamicatal, zonisamide  ESS 4/24 on intake  New   Insomnia/ possible DSPS     Feels " his brain is overactive, 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   All questions/concerns were discussed.     Two patient identifiers used prior to evaluation.

## 2023-11-28 ENCOUNTER — TELEPHONE (OUTPATIENT)
Dept: SLEEP MEDICINE | Facility: OTHER | Age: 48
End: 2023-11-28
Payer: COMMERCIAL

## 2023-11-29 ENCOUNTER — HOSPITAL ENCOUNTER (OUTPATIENT)
Dept: SLEEP MEDICINE | Facility: OTHER | Age: 48
Discharge: HOME | End: 2023-11-29
Attending: INTERNAL MEDICINE
Payer: COMMERCIAL

## 2023-11-29 DIAGNOSIS — G47.00 INSOMNIA, UNSPECIFIED TYPE: ICD-10-CM

## 2023-11-29 DIAGNOSIS — F51.09 OTHER INSOMNIA NOT DUE TO A SUBSTANCE OR KNOWN PHYSIOLOGICAL CONDITION: ICD-10-CM

## 2023-11-29 PROCEDURE — 95800 SLP STDY UNATTENDED: CPT

## 2023-11-30 ENCOUNTER — PATIENT MESSAGE (OUTPATIENT)
Dept: SLEEP MEDICINE | Facility: CLINIC | Age: 48
End: 2023-11-30

## 2023-11-30 ENCOUNTER — PATIENT MESSAGE (OUTPATIENT)
Dept: NEUROLOGY | Facility: CLINIC | Age: 48
End: 2023-11-30
Payer: COMMERCIAL

## 2023-11-30 PROBLEM — G47.00 INSOMNIA: Status: ACTIVE | Noted: 2023-11-30

## 2023-11-30 PROCEDURE — 95806 PR SLEEP STUDY, UNATTENDED, SIMUL RECORD HR/O2 SAT/RESP FLOW/RESP EFFT: ICD-10-PCS | Mod: 26,,, | Performed by: INTERNAL MEDICINE

## 2023-11-30 PROCEDURE — 95806 SLEEP STUDY UNATT&RESP EFFT: CPT | Mod: 26,,, | Performed by: INTERNAL MEDICINE

## 2023-12-13 ENCOUNTER — PATIENT MESSAGE (OUTPATIENT)
Dept: NEUROLOGY | Facility: CLINIC | Age: 48
End: 2023-12-13
Payer: COMMERCIAL

## 2024-01-22 ENCOUNTER — PATIENT MESSAGE (OUTPATIENT)
Dept: NEUROLOGY | Facility: CLINIC | Age: 49
End: 2024-01-22
Payer: COMMERCIAL

## 2024-01-22 DIAGNOSIS — G40.209 COMPLEX PARTIAL SEIZURES EVOLVING TO GENERALIZED TONIC-CLONIC SEIZURES: Primary | ICD-10-CM

## 2024-01-22 DIAGNOSIS — F44.5 FUNCTIONAL NEUROLOGICAL SYMPTOM DISORDER WITH ATTACKS OR SEIZURES: ICD-10-CM

## 2024-01-24 NOTE — TELEPHONE ENCOUNTER
Bizarre behavior during sleep -> ambulatory EEG for event capture/characterization    Maggie Rodriguez MD PhD [facility]  Neurology-Epilepsy  [facility].

## 2024-02-21 ENCOUNTER — OFFICE VISIT (OUTPATIENT)
Dept: NEUROLOGY | Facility: CLINIC | Age: 49
End: 2024-02-21
Payer: COMMERCIAL

## 2024-02-21 DIAGNOSIS — R41.9 COGNITIVE COMPLAINTS: ICD-10-CM

## 2024-02-21 DIAGNOSIS — F44.5 FUNCTIONAL NEUROLOGICAL SYMPTOM DISORDER WITH ATTACKS OR SEIZURES: Primary | ICD-10-CM

## 2024-02-21 DIAGNOSIS — F41.9 ANXIETY: ICD-10-CM

## 2024-02-21 DIAGNOSIS — G40.209 COMPLEX PARTIAL SEIZURES EVOLVING TO GENERALIZED TONIC-CLONIC SEIZURES: ICD-10-CM

## 2024-02-21 PROCEDURE — 99499 UNLISTED E&M SERVICE: CPT | Mod: 95,,, | Performed by: CLINICAL NEUROPSYCHOLOGIST

## 2024-02-21 PROCEDURE — 90791 PSYCH DIAGNOSTIC EVALUATION: CPT | Mod: 95,,, | Performed by: CLINICAL NEUROPSYCHOLOGIST
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
## 2024-02-22 PROBLEM — R41.9 COGNITIVE COMPLAINTS: Status: ACTIVE | Noted: 2024-02-22

## 2024-03-05 ENCOUNTER — OFFICE VISIT (OUTPATIENT)
Dept: NEUROLOGY | Facility: CLINIC | Age: 49
End: 2024-03-05
Payer: COMMERCIAL

## 2024-03-05 DIAGNOSIS — R41.3 MEMORY DIFFICULTIES: Primary | ICD-10-CM

## 2024-03-05 DIAGNOSIS — F41.9 ANXIETY: ICD-10-CM

## 2024-03-05 DIAGNOSIS — F44.5 FUNCTIONAL NEUROLOGICAL SYMPTOM DISORDER WITH ATTACKS OR SEIZURES: ICD-10-CM

## 2024-03-05 PROCEDURE — 96139 PSYCL/NRPSYC TST TECH EA: CPT | Mod: S$GLB,,, | Performed by: CLINICAL NEUROPSYCHOLOGIST

## 2024-03-05 PROCEDURE — 99999 PR PBB SHADOW E&M-EST. PATIENT-LVL II: CPT | Mod: PBBFAC,,, | Performed by: CLINICAL NEUROPSYCHOLOGIST

## 2024-03-05 PROCEDURE — 96138 PSYCL/NRPSYC TECH 1ST: CPT | Mod: S$GLB,,, | Performed by: CLINICAL NEUROPSYCHOLOGIST

## 2024-03-05 PROCEDURE — 96133 NRPSYC TST EVAL PHYS/QHP EA: CPT | Mod: S$GLB,,, | Performed by: CLINICAL NEUROPSYCHOLOGIST

## 2024-03-05 PROCEDURE — 96132 NRPSYC TST EVAL PHYS/QHP 1ST: CPT | Mod: S$GLB,,, | Performed by: CLINICAL NEUROPSYCHOLOGIST

## 2024-03-05 PROCEDURE — 99499 UNLISTED E&M SERVICE: CPT | Mod: S$GLB,,, | Performed by: CLINICAL NEUROPSYCHOLOGIST
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
## 2024-03-11 NOTE — PATIENT INSTRUCTIONS
PSYCHOGENIC NON-EPILEPTIC EVENTS (PNEE)    What are PNEE and are they common?  A seizure is a temporary loss of control, often with abnormal movements, unconsciousness, or both. Epileptic seizures are caused by sudden abnormal electrical discharges in the brain.   Psychogenic non-epileptic events (PNEE) look like epileptic seizures to patients and their family. But, PNEE is not caused by abnormal electrical discharges. Instead of a neurologic cause, there is typically a psychological cause.  Terms like pseudoseizures are occasionally used by medical providers, but we do not find this term useful. First, pseudo implies the episodes are not genuine or fake, which is not the case as the events are very real to the person experiencing them. Secondly, describing these episodes as a type of seizure or a seizure caused by stress is inaccurate as the events are not seizures.    PNEE is diagnosed in 20 to 30% of patients seen at epilepsy centers for difficult to control seizures. Moreover, PNEE is nearly as common as multiple sclerosis or trigeminal neuralgia in the general population.    How is PNEE diagnosed and how can we be sure the doctor did not miss something?  PNEE is commonly misdiagnosed in the beginning for a variety of reasons. As a result, patients are unfortunately told they have epileptic seizures, but continue to have events despite being on anti-epileptic medications. Over time, physicians may suspect PNEE and refer a patient to [facility] for a comprehensive evaluation.   The best way to diagnose PNEE involves an inpatient evaluation at the Epilepsy Monitoring Unit (EMU). At the EMU, your events will be tracked both with EEG and video monitoring. If you have events on video, but there are no significant changes on EEG (i.e., no abnormal electrical discharges), then there is a high suspicion for PNEE. Next, we will have our neuropsychologist meet with you to discuss your history  and symptoms. Together, our team will then discuss your case to see if a PNEE diagnosis makes sense. It is important to understand that through a detailed analysis of the video EEG recordings and a neuropsychological consultation, the diagnosis of PNEE can be made with a high degree of certainty.    Can you have PNEE and actual epileptic seizures?  Yes, you can have PNEE and epileptic seizures. This will require both neurologic and psychological/psychiatric treatment for optimal management.     What causes PNEE?  There is no one cause for PNEE. By definition, PNEE is a physical expression (e.g., shaking, loss of awareness) of a psychological concern. Most research shows that PNEE is common in individuals with significant stress, active psychological symptoms (e.g., anxiety, depression), history of a traumatic event (e.g., sexual, physical, emotional abuse), recent stressful event (e.g., death of a loved one, relationship disruption), a tendency to internalize and not acknowledge difficult feelings, and so forth. Most individuals with PNEE have needed psychological treatment at some point in their lives, but never had the opportunity to get it, treatment was not effective, or treatment was too difficult to stick with it.    Thus, PNEE is traditionally called Conversion Disorder or Functional Neurologic Disorder. This is because patients convert untreated psychological concerns or stress into physical/neurologic symptoms that look like seizures. It is similar to someone with an anxiety disorder who experiences a rapid heartbeat, but nothing is wrong with their heart. Treatment, then, must address the psychological symptoms in order to adequate manage PNEE. In the same way, patients with panic attacks generally need to see a psychologist/psychiatrist rather than a cardiologist.    What is the best treatment for PNEE?  The best treatment for PNEE involves weekly one-on-one psychotherapy with a counselor, social  worker, psychologist, or psychiatrist trained to work with patients with PNEE/Conversion Disorder. It is important that you ask the mental health provider if they are trained to work with Conversion Disorder. The most common treatment is Cognitive Behavioral Therapy, but other treatment modalities are also useful. It is critical that you stay in treatment for at least 6 months in order to experience any benefit. But, it is also important that your therapist be experienced and skilled in working with individuals with your likely diagnosis. Typically, patients are uncomfortable with therapy at the beginning as you are talking about difficult topics. Over the next several weeks, then your comfort level should improve.     Will I get better?  Yes, you will typically get better with sustained treatment. In fact, individuals often experience a natural reduction of episodes (and sometimes complete resolution) once properly diagnosed. However, most individuals require treatment for full symptom resolution.    It is important to not think of yourself as crazy or insane. Instead, Conversion Disorder typically has a much better prognosis than a lifelong chronic neurologic disorder, such as Epilepsy.   Correct diagnosis and treatment of PNEE often results in a very positive prognosis.     How can I find a clinician to treat PNEE?  Getting mental health treatment can be complex, unfortunately. We recommend the following approach:  Consider your finances to see if you can pay out of pocket. Typical rates are $100.00 to $120.00 per session and, depending on your insurance company, you can file for out of network benefits to recoup some of the costs. Many mental health providers are out of pocket or self-pay and have more openings.   Consider your insurance to see what they will pay and what your co-pay will be.   Consider when you can do therapy because it will need to be weekly for several months. It is critical that you  and your family prioritize therapy during the week so you get better.  Consider who is going to take you there if you have been ordered to not drive in the meantime.   Talk with friends and family or pastors as many of them may have a therapist recommendation.  Check out this website: [email or web address].SelSahara   Type in your zip code and on the left hand side of the screen select your insurance and any other categories.   Therapists will come up and you can view their profiles and see who would work best    Will I still see my Neurologist and will I have medication changes?  In the meantime, you will likely still follow-up with Neurology. They will let you know this at discharge. They will also discuss any medication changes with you.    When should I stay home and manage my symptoms versus going to the ER?  Prior to discharge, your Neurology team will explain to you in detail what you/your family should do if/when you have another event. This may mean staying home if you have a typical non-epileptic event. Or, it may mean calling your doctor or going to the ER. Following those instructions is important.     PRACTICE GOOD COGNITIVE HYGIENE  Engage in regular exercise, which increases alertness and arousal and can improve attention and focus.  Consider lower impact exercises, such as yoga or light walking. Try to exercise for at least 150 minutes per week  Get a good night's sleep, as this can enhance alertness and cognition.  Eat healthy foods and balanced meals. It is notable that research indicates certain nutrients may aid in brain function, such as B vitamins (especially B6, B12, and folic acid), antioxidants (such as vitamins C and E, and beta carotene), and Omega-3 fatty acids. Here are some common tips for diet (Adopted from Daniela et al, NEJ, 2018):  Eat primarily plant-based foods, such as fruits and vegetables, whole grains, legumes   (beans) and nuts.  Limit refined carbohydrates (white pasta, bread,  rice).  Replace butter with healthy fats such as olive oil.  Use herbs and spices instead of salt to flavor foods.  Limit red meat and processed meats to no more than a few times a month.  Avoid sugary sodas, bakery goods, and sweets.  Eat fish and poultry at least twice a week.  Keep your brain active. Find activities to stay mentally active, such as reading, games (cards, checkers), puzzles (crosswords, Sudoku, jig saw), crafts (models, woodworking), gardening, or participating in activities in the community.  Stay socially engaged. Continue staying active with your family and friends.    RESOURCE BOOKS & POD CASTS  Consider purchasing the following books on brain health:   High-Octane Brain: 5 Science-Based Steps to Sharpen Your Memory and Reduce Your Risk of Alzheimer's by Shira Fabian, PhD, ABPP-CN.   Keep Your Wits About You: The Science of Brain Maintenance As You Age by Karis Watts, PhD.   The Brain Health Book: Using the Power of Neuroscience to Improve Your Life by Nicolás Chen, PhD, ABPP-CN.     Consider purchasing the book, Smart but Scattered Guide to Success: How to Use Your Brain's Executive Skills to Keep Up, Stay Calm, and Get Organized at Work and at Home by Raine Pickens EdD and Morris Romoe, PhD    Consider listening to Brain Beat, a pod cast series by the National Academy of Neuropsychology Foundation featuring discussions with experts on brain health and functioning.     BRAIN TRAINING APPS  · BrainHQ (https://www.brainhTrustPoint International.Endomondo/) - BrainHQ has more than two dozen brain-training exercises organized into six categories: Attention, Brain Speed, Memory, People Skills, Intelligence, and Navigation. It allows you to fit brain exercises into your busy life, and access brain training on most internet-connected devices. Plus, each exercise continuously adapts to your unique performance. So you train at the right level for you.     · Mind Games (https://www.mindgames.com/) - Mind Games is a great  collection of games based in part on principles derived from cognitive tasks to help you practice different mental skills. This includes a handful of free games. Additionally, there are a number of trial games included that can be played 3 times. All games include your score history and a graph of your progress. Using some principles of standardized testing, your scores are also converted to a standard scale so that you can see where you need work and excel. The training center does the work for you by picking the perfect mix of exercises to keep you engaged.     · Elevate (https://Dream [employer] Entertainment.com/) - Elevate was selected by [employer] as Ryan of the Year! Elevate is a brain training program designed to improve focus, speaking abilities, processing speed, memory, math skills, and more. Each person is provided with a personalized training program that adjusts over time to maximize results. Theoretically, the more you train with Elevate, the more you'll improve critical cognitive skills that are proven to boost productivity, earning power, and self-confidence. Users who train at least 3 times per week have reported dramatic gains and increased confidence. In-ryan purchases.     · Peak (https://www.[employer] Media.net/) - Reach Peak performance with over 40 unique games, each one developed by neuroscientists and game experts to challenge your cognitive skills and push you further. Use , the  for your brain, to find the right workout for you at the right time. Choose from 's best recommendations to push your skills to the max. Or take contextual workouts like Coffee Break if you're short on time.  will help you track your progress using in-depth insights and keep you going when you need it most. Play for free or upgrade to Pro and get the best brain training experience available. In-ryan purchases.     OTHER SUGGESTIONS  Games and Apps like Sudoku; Crossword Puzzles; Word Find; Memory Games; Logic Games;  Solitaire; and Hidden Object Mysteries. Find some you like and play them. It will exercise many of the skills necessary to improve function.    COGNITIVE TIPS AND STRATEGIES  The following tips and strategies are provided to help assist in daily activities:      Attention: Remember that inattention and lack of focus are major culprits to forgetting information so be sure and practice paying attention for adequate learning of information. If you rely on passive attention to remembering something (e.g., yeah, uh-huh approach), you'll find you cannot recall it later. I recommend the following to improve attention, which may aid in later recall:  1. Reduce distractions in the area as much as possible  2. Look at the person as they are speaking to you.   3. Paraphrase as they are speaking  4. Write down important pieces of information   5. Ask them to repeat if you zone out.  6. Have them simplify and reduce information that you need to attend to during conversation.  7. Have visual cues to remind you if you need to do something later.     Processing Speed:  1. Using multiple modalities (e.g., listening, writing notes, asking questions, recording) to learn new information is likely to allow additional time for processing, thus improving memory for the material.   2. Allowing sufficient time to complete tasks will reduce frustration and help to ensure completion.     Executive Functionin. Don't attempt to multi-task.  Separate tasks so that each can be completed one at a time  2. Consider using a calendar/day planner, as that may be effective to help you plan and stay on track.  Color-coding specific tasks by importance may add additional benefit to your planner  3. Break down large projects into smaller tasks and write down the steps to completing the task.  Taking notes while reading can help with recall.     Storing Information: Use the below strategies to help you further enhance how information is stored  1.  Rehearse - Immediately after seeing/hearing something, try to recall it.  Wait a few minutes, then check again.  Gradually lengthen the intervals between rehearsals.  2. Repetition of learned material is critical to ensure storage of information to be learned. Self-test at home to ensure learning.  3. Write down important information to improve your attention and focus and to have something to look back on when you need to recall it.  4. Make sure the person doesn't rattle off, but presents in a clear, logical, and unhurried manner.      Recalling Information:  1. Jog your memory - Lose something?  Think back to when you last had it.  What did you do next?  And after that?  Mentally walk yourself through each activity that followed.  Prodding your memory this way may enable you to recall the location of the missing item.  2. Use a cue - Symbolic reminders (the proverbial string around the finger) are helpful.  So too are memos, timers, calendar notes, etc.--keep them in visible, appropriate place  3. Get organized - Have fixed locations for all important papers, key phone numbers, medications, keys, wallet, glasses, tools, etc.  4. Develop routines - Routines can anchor memories so they do not drift away.       Word Finding:   Not being able to find a word when you need it is a common and very annoying problem. It is not strictly a memory issue, but more a filing and retrieval issue. You know the word or name you want, but it cannot be found in your brain file. It is like having all the files in your file cabinet emptied on the floor. Every piece of information is there but finding it can be a challenge.   One strategy that may help is working with a speech pathologist/therapist to learn techniques to decrease word finding problems. Some of those strategies/techniques include the following:  Use circumlocutions. Describe the object you're trying to name instead of naming it.  Recite you're A, B, Cs. Go through the  alphabet to see if a letter triggers finding the word.  Picture it. Try to visualize the spelling or writing of the word.  Relax. The harder you try to force yourself to come up with the word, the more frustrated you get and the worse you function.   Write it down. In situations where using correct words is critical, such as communicating on the radio while flying, write down the key words so that they are in front of you if needed.  Use word association. For words or names you continually misfile and can't find, try to come up with a word association, something that reminds you of the word or name.  Write a script. Try scripting something important that you want to say. Either write it out or practice it beforehand, so that you get it right.  Play word games. Doing crossword puzzles and playing word finding games may help your brain become more efficient at filing and retrieving words.

## 2024-03-21 ENCOUNTER — OFFICE VISIT (OUTPATIENT)
Dept: NEUROLOGY | Facility: CLINIC | Age: 49
End: 2024-03-21
Payer: COMMERCIAL

## 2024-03-21 DIAGNOSIS — F44.5 FUNCTIONAL NEUROLOGICAL SYMPTOM DISORDER WITH ATTACKS OR SEIZURES: ICD-10-CM

## 2024-03-21 DIAGNOSIS — F41.9 ANXIETY: ICD-10-CM

## 2024-03-21 DIAGNOSIS — R41.9 COGNITIVE COMPLAINTS: Primary | ICD-10-CM

## 2024-03-21 PROCEDURE — 99499 UNLISTED E&M SERVICE: CPT | Mod: S$GLB,,, | Performed by: CLINICAL NEUROPSYCHOLOGIST

## 2024-03-21 NOTE — PROGRESS NOTES
NEUROPSYCHOLOGICAL EVALUATION FEEDBACK    Pal Owen Wijames attended a feedback session today.  We discussed the results of the neuropsychological evaluation and I gave time to discuss questions and concerns. For full evaluation details, please see the note from this provider dated 3/05/2024. A copy of the report was printed and provided to him today. 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

## 2024-04-22 ENCOUNTER — PATIENT MESSAGE (OUTPATIENT)
Dept: NEUROLOGY | Facility: CLINIC | Age: 49
End: 2024-04-22
Payer: COMMERCIAL

## 2024-04-23 DIAGNOSIS — G40.209 COMPLEX PARTIAL SEIZURES EVOLVING TO GENERALIZED TONIC-CLONIC SEIZURES: ICD-10-CM

## 2024-04-23 DIAGNOSIS — F41.9 ANXIETY: ICD-10-CM

## 2024-04-24 RX ORDER — CLONAZEPAM 0.5 MG/1
TABLET ORAL
Qty: 40 TABLET | Refills: 5 | Status: SHIPPED | OUTPATIENT
Start: 2024-04-24

## 2024-05-03 ENCOUNTER — TELEPHONE (OUTPATIENT)
Dept: NEUROLOGY | Facility: CLINIC | Age: 49
End: 2024-05-03
Payer: COMMERCIAL

## 2024-05-03 ENCOUNTER — PATIENT MESSAGE (OUTPATIENT)
Dept: NEUROLOGY | Facility: CLINIC | Age: 49
End: 2024-05-03
Payer: COMMERCIAL

## 2024-05-03 NOTE — TELEPHONE ENCOUNTER
Called Mr. Mccrary. Discussed feeling hopeless and overwhelmed, panicking more and withdrawing some socially. Has felt his cognition has also been worsening. Has had thoughts of hanging himself. Says he does not plan to act on them. He feels safe currently. No homicidal ideation. He is meeting with his therapist tonight for an add-on appointment. Discussed importance of having additional services to help him with the increase in symptoms he is experiencing. I am placing a referral for [facility] and going to message Mr. Mccrary with the number to call. He told me he will call and agrees to attend this program. I am also messaging  Ashvin to help talk with Mr. Mccrary for epilepsy support. He told me he would appreciate that service as well. Given that there is no imminent risk, no further action is needed at the present time for his safety. Reviewed what to do if he does feel at risk of harming himself or others. Informed to call 911. Informed of his importance to me and that I will call back and check on him on Monday. 25 minutes.

## 2024-05-13 ENCOUNTER — TELEPHONE (OUTPATIENT)
Dept: NEUROLOGY | Facility: CLINIC | Age: 49
End: 2024-05-13
Payer: COMMERCIAL

## 2024-05-13 ENCOUNTER — PATIENT MESSAGE (OUTPATIENT)
Dept: NEUROLOGY | Facility: CLINIC | Age: 49
End: 2024-05-13
Payer: COMMERCIAL

## 2024-05-13 ENCOUNTER — OFFICE VISIT (OUTPATIENT)
Dept: NEUROLOGY | Facility: CLINIC | Age: 49
End: 2024-05-13
Payer: COMMERCIAL

## 2024-05-13 DIAGNOSIS — F44.5 FUNCTIONAL NEUROLOGICAL SYMPTOM DISORDER WITH ATTACKS OR SEIZURES: Primary | ICD-10-CM

## 2024-05-13 DIAGNOSIS — F41.9 ANXIETY: ICD-10-CM

## 2024-05-13 DIAGNOSIS — F44.5 FUNCTIONAL NEUROLOGICAL SYMPTOM DISORDER WITH ATTACKS OR SEIZURES: ICD-10-CM

## 2024-05-13 DIAGNOSIS — G40.209 COMPLEX PARTIAL SEIZURES EVOLVING TO GENERALIZED TONIC-CLONIC SEIZURES: Primary | ICD-10-CM

## 2024-05-13 PROCEDURE — 90791 PSYCH DIAGNOSTIC EVALUATION: CPT | Mod: 95,,, | Performed by: SOCIAL WORKER
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
## 2024-05-13 NOTE — TELEPHONE ENCOUNTER
Returned Mr. Mccrary's call. Discussed his frustration and worsening symptoms, including worsening cognitive functioning. Has had some passive suicidal ideation but states his father is his biggest protective factor and he would not act on his thoughts. He has no plan and no intent. He is going to reach out to Ashvin in social work to take the 11:15 AM virtual appointment today and is calling his therapist to see about another add-on appointment (next time he is scheduled for a session is Friday). 31 minutes.

## 2024-05-14 ENCOUNTER — PATIENT MESSAGE (OUTPATIENT)
Dept: NEUROLOGY | Facility: CLINIC | Age: 49
End: 2024-05-14
Payer: COMMERCIAL

## 2024-05-14 DIAGNOSIS — G40.909 SEIZURE DISORDER: ICD-10-CM

## 2024-05-14 DIAGNOSIS — R26.81 GAIT INSTABILITY: ICD-10-CM

## 2024-05-14 DIAGNOSIS — R41.3 MEMORY DIFFICULTIES: ICD-10-CM

## 2024-05-14 DIAGNOSIS — F44.5 FUNCTIONAL NEUROLOGICAL SYMPTOM DISORDER WITH ATTACKS OR SEIZURES: Primary | ICD-10-CM

## 2024-05-14 DIAGNOSIS — G47.00 INSOMNIA, UNSPECIFIED TYPE: ICD-10-CM

## 2024-05-16 NOTE — TELEPHONE ENCOUNTER
Struggling with cognitive clouding and diffuse whole-body pain.    Referral to the functional restoration program    Maggie Rodriguez MD PhD FACNS  Neurology-Epilepsy

## 2024-05-17 ENCOUNTER — OFFICE VISIT (OUTPATIENT)
Dept: NEUROLOGY | Facility: CLINIC | Age: 49
End: 2024-05-17
Payer: COMMERCIAL

## 2024-05-17 ENCOUNTER — TELEPHONE (OUTPATIENT)
Dept: ADMINISTRATIVE | Facility: OTHER | Age: 49
End: 2024-05-17
Payer: COMMERCIAL

## 2024-05-17 ENCOUNTER — PATIENT MESSAGE (OUTPATIENT)
Dept: NEUROLOGY | Facility: CLINIC | Age: 49
End: 2024-05-17

## 2024-05-17 DIAGNOSIS — F44.5 FUNCTIONAL NEUROLOGICAL SYMPTOM DISORDER WITH ATTACKS OR SEIZURES: ICD-10-CM

## 2024-05-17 DIAGNOSIS — G40.209 COMPLEX PARTIAL SEIZURES EVOLVING TO GENERALIZED TONIC-CLONIC SEIZURES: Primary | ICD-10-CM

## 2024-05-17 DIAGNOSIS — F41.9 ANXIETY: ICD-10-CM

## 2024-05-17 PROCEDURE — 90837 PSYTX W PT 60 MINUTES: CPT | Mod: 95,,, | Performed by: SOCIAL WORKER

## 2024-05-17 NOTE — TELEPHONE ENCOUNTER
Left voice message for patient to return call to schedule appointment from referral to Functional Restoration appointment. Contact number provided, and My Chart message to be sent.  Martha VELEZ [phone]

## 2024-05-20 NOTE — PROGRESS NOTES
"Individual Psychotherapy (PhD/LCSW)    5/17/2024    Site:  Telemed         Location: [place]    Therapeutic Intervention: Met with patient.  Outpatient - Insight oriented psychotherapy 60 min - CPT code 83448, Outpatient - Behavior modifying psychotherapy 60 min - CPT code 58482, and Outpatient - Supportive psychotherapy 60 min - CPT Code 42333    Chief complaint/reason for encounter: depression, anxiety, somatic, and interpersonal     Interval history and content of current session:   Patient presented with a marked decrease in depression and agitation compared to earlier this week.   Patient divulged that his wife came home from the hospital just today.      He reports that although they will not be moving anytime soon, he will look into moving within a year if possible.     He discussed PNES / FND and identify heat and humidity as a major trigger.  LCSW acknowledged and noted that along with the physical trigger, there are generally internal triggers.  HE admits to negative thinking such as "you're an invalid, you're trapped inside."  LCSW agreed that these are definitely triggers and reviewed ways to counteract these negative thoughts.     They have recommended group therapy for he and his wife and he wants his wife to attend first.  LCSW agreed.  His neurologist also referred him to a program and he felt guilty about it, but LCSW assured that if an MD referred him, then he should acknowledge that they think it will be of benefit to him.     Treatment plan:  Target symptoms: depression, adjustment, work stress  Why chosen therapy is appropriate versus another modality: relevant to diagnosis  Outcome monitoring methods: self-report, observation  Therapeutic intervention type: insight oriented psychotherapy, behavior modifying psychotherapy, supportive psychotherapy    Risk parameters:  Patient reports no suicidal ideation  Patient reports no homicidal ideation  Patient reports no self-injurious behavior  Patient " reports no violent behavior    Verbal deficits: None    Patient's response to intervention:  The patient's response to intervention is accepting.    Progress toward goals and other mental status changes:  The patient's progress toward goals is fair , good.    Diagnosis:     ICD-10-CM ICD-9-CM   1. Complex partial seizures evolving to generalized tonic-clonic seizures  G40.209 345.40   2. 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 Anxiety  F41.9 300.00       Plan:  individual psychotherapy    Return to clinic: 1 week    Length of Service (minutes): 60

## 2024-05-21 ENCOUNTER — TELEPHONE (OUTPATIENT)
Dept: ADMINISTRATIVE | Facility: OTHER | Age: 49
End: 2024-05-21
Payer: COMMERCIAL

## 2024-05-21 ENCOUNTER — PATIENT MESSAGE (OUTPATIENT)
Dept: NEUROLOGY | Facility: CLINIC | Age: 49
End: 2024-05-21
Payer: COMMERCIAL

## 2024-05-21 NOTE — TELEPHONE ENCOUNTER
Spoke to patient who cancelled appointment as he did not feel this was advantageous to him. I advised him to reach out to  for secondary suggestion on his behalf. Patient verbalized understanding of this.

## 2024-05-22 ENCOUNTER — TELEPHONE (OUTPATIENT)
Dept: PSYCHIATRY | Facility: CLINIC | Age: 49
End: 2024-05-22
Payer: COMMERCIAL

## 2024-05-22 NOTE — TELEPHONE ENCOUNTER
Spoke with pt regarding BEBP inquiry. Communicated the BEBp clinic does not treat neurological disorders. Inquired if pt would like to work on anxiety and pt stated he did not know and was unsure if he could participate due to neuro issues. Writer agreed to update patient after hearing back from treatment team.

## 2024-05-24 ENCOUNTER — OFFICE VISIT (OUTPATIENT)
Dept: NEUROLOGY | Facility: CLINIC | Age: 49
End: 2024-05-24
Payer: COMMERCIAL

## 2024-05-24 DIAGNOSIS — G40.209 COMPLEX PARTIAL SEIZURES EVOLVING TO GENERALIZED TONIC-CLONIC SEIZURES: Primary | ICD-10-CM

## 2024-05-24 DIAGNOSIS — F44.5 FUNCTIONAL NEUROLOGICAL SYMPTOM DISORDER WITH ATTACKS OR SEIZURES: ICD-10-CM

## 2024-05-24 PROCEDURE — 90837 PSYTX W PT 60 MINUTES: CPT | Mod: 95,,, | Performed by: SOCIAL WORKER

## 2024-05-24 NOTE — PROGRESS NOTES
"Individual Psychotherapy (PhD/LCSW)    5/24/2024    Site:  Telemed         Location: [place]     Therapeutic Intervention: Met with patient.  Outpatient - Insight oriented psychotherapy 60 min - CPT code 35544, Outpatient - Behavior modifying psychotherapy 60 min - CPT code 23829, and Outpatient - Supportive psychotherapy 60 min - CPT Code 73887    Chief complaint/reason for encounter: depression, anxiety, and somatic     Interval history and content of current session:   Patient began session by reporting that he is looking for "life resources".  He has reported his need to the medical team, but they recommended OT and / or PT.   He notes he does not need this at this time.  He also does not want additional medication in regards to mental health. He also acknowledged LCS's resource related to virtual support groups related to FND.     He reports he did not qualify for BEEP program.  He does report that he is also not interested in attending in person groups. LCSW did agree that the groups can be somewhat distressing.   His goal is he needs to do things that "feed his soul" but he has no time to do it.  LCSW asked patient to explore, what are they: art, music, / mountains, nature.  He reports negative thoughts such as ( this state and nature is basically boggy, swam and his brain is pre set on you don't want to behere  He has a Hx of building cars and working on his motorcycle. He currently cannot work on motorcycle since [facility] has air [employer] guest and he does not want people hearing or smelling it.    Has been doing some art - and things that are more fogiving.  He has some tremors randomly, but not too much. Art can be less satisfying.  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     I am at such a loss.   Movmement tremors last night - got most things accompoished for [facility],.  He also kept boundaries and reported he would " not get on ladders. LCSW agreed that swimming in pools, getting on ladders, operating large machinery are generally a no go for epilepsy.     LCSW encouraged keeping a completed task list versus a to do list.  LCSW noted that other clients have found this helpful.     Reviewed FND triggers such as heat and negative thoughts.  He reports tremors last night and LCSW explored that the patient may have been thinking about stress provided by [facility].     Treatment plan:  Target symptoms: depression, anxiety , adjustment  Why chosen therapy is appropriate versus another modality: relevant to diagnosis  Outcome monitoring methods: self-report, observation  Therapeutic intervention type: insight oriented psychotherapy, behavior modifying psychotherapy, supportive psychotherapy    Risk parameters:  Patient reports no suicidal ideation  Patient reports no homicidal ideation  Patient reports no self-injurious behavior  Patient reports no violent behavior    Verbal deficits: None    Patient's response to intervention:  The patient's response to intervention is guarded.    Progress toward goals and other mental status changes:  The patient's progress toward goals is limited.    Diagnosis:     ICD-10-CM ICD-9-CM   1. Complex partial seizures evolving to generalized tonic-clonic seizures  G40.209 345.40   2. 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

## 2024-05-27 ENCOUNTER — TELEPHONE (OUTPATIENT)
Dept: PSYCHIATRY | Facility: CLINIC | Age: 49
End: 2024-05-27
Payer: COMMERCIAL

## 2024-05-27 ENCOUNTER — PATIENT MESSAGE (OUTPATIENT)
Dept: PSYCHIATRY | Facility: CLINIC | Age: 49
End: 2024-05-27
Payer: COMMERCIAL

## 2024-05-27 NOTE — TELEPHONE ENCOUNTER
Mr. Mccrary was referred to IOP by Dr. Everett and they referred him to the [facility]. Our BEBP assistant brought his case to the team meeting because Mr. Mccrary was unsure what protocol he would be interested in and was unsure whether this treatment approach would help. Mr. Mccrary admitted to me he was feeling confused and upset about being told to preoccupy less on his symptoms and being told he may not be the best fit for IOP or BEBP.    I offered Mr. Mccrary an intake appointment to meet and discuss the 12-week Brief CBT protocol. He was agreeable with the initial intake and will be scheduled in person for June 5 at 9 am.

## 2024-05-29 ENCOUNTER — OFFICE VISIT (OUTPATIENT)
Dept: URGENT CARE | Facility: CLINIC | Age: 49
End: 2024-05-29
Payer: COMMERCIAL

## 2024-05-29 ENCOUNTER — OFFICE VISIT (OUTPATIENT)
Dept: NEUROLOGY | Facility: CLINIC | Age: 49
End: 2024-05-29
Payer: COMMERCIAL

## 2024-05-29 VITALS
RESPIRATION RATE: 18 BRPM | DIASTOLIC BLOOD PRESSURE: 81 MMHG | TEMPERATURE: 99 F | HEART RATE: 93 BPM | OXYGEN SATURATION: 97 % | SYSTOLIC BLOOD PRESSURE: 129 MMHG

## 2024-05-29 DIAGNOSIS — F44.5 FUNCTIONAL NEUROLOGICAL SYMPTOM DISORDER WITH ATTACKS OR SEIZURES: Primary | ICD-10-CM

## 2024-05-29 DIAGNOSIS — K04.7 TOOTH INFECTION: Primary | ICD-10-CM

## 2024-05-29 DIAGNOSIS — H92.02 EAR PAIN, REFERRED, LEFT: ICD-10-CM

## 2024-05-29 DIAGNOSIS — K08.89 TOOTHACHE: ICD-10-CM

## 2024-05-29 DIAGNOSIS — F32.A DEPRESSION, UNSPECIFIED DEPRESSION TYPE: ICD-10-CM

## 2024-05-29 PROCEDURE — 99213 OFFICE O/P EST LOW 20 MIN: CPT | Mod: S$GLB,,, | Performed by: FAMILY MEDICINE

## 2024-05-29 PROCEDURE — 90837 PSYTX W PT 60 MINUTES: CPT | Mod: 95,,, | Performed by: SOCIAL WORKER

## 2024-05-29 RX ORDER — CLINDAMYCIN HYDROCHLORIDE 300 MG/1
300 CAPSULE ORAL 4 TIMES DAILY
Qty: 28 CAPSULE | Refills: 0 | Status: SHIPPED | OUTPATIENT
Start: 2024-05-29 | End: 2024-06-05

## 2024-05-29 NOTE — PROGRESS NOTES
Subjective:      Patient ID: Pal Mccrary is a 48 y.o. male.    Vitals:  oral temperature is 98.5 °F (36.9 °C). His blood pressure is 129/81 and his pulse is 93. His respiration is 18 and oxygen saturation is 97%.     Chief Complaint: Otalgia    Patient present with left ear pain the going down in to his jaw on the left side .  Also complains of left lower jaw swelling and tooth pain for last few days.  Denies fever, chills, headache, dizziness, sore throat, cough.    Otalgia   There is pain in both ears. This is a new problem. Episode onset: 2 days. The problem occurs constantly. The problem has been gradually worsening. There has been no fever. The pain is at a severity of 8/10. The pain is moderate. Associated symptoms include headaches and rhinorrhea. Pertinent negatives include no abdominal pain, coughing, diarrhea, ear discharge, hearing loss, neck pain, rash, sore throat or vomiting. He has tried nothing for the symptoms. The treatment provided no relief.       HENT:  Positive for ear pain. Negative for ear discharge, hearing loss and sore throat.    Neck: Negative for neck pain.   Respiratory:  Negative for cough.    Gastrointestinal:  Negative for abdominal pain, vomiting and diarrhea.   Skin:  Negative for rash.   Neurological:  Positive for headaches.      Objective:     Physical Exam   Constitutional: He is oriented to person, place, and time. He appears well-developed. He is cooperative.  Non-toxic appearance. He does not appear ill. No distress.   HENT:   Head: Normocephalic and atraumatic.   Ears:   Right Ear: Hearing, tympanic membrane, external ear and ear canal normal. no impacted cerumen  Left Ear: Hearing, tympanic membrane, external ear and ear canal normal. no impacted cerumen  Nose: Nose normal. No mucosal edema, rhinorrhea, nasal deformity or congestion. No epistaxis. Right sinus exhibits no maxillary sinus tenderness and no frontal sinus tenderness. Left sinus exhibits no maxillary  sinus tenderness and no frontal sinus tenderness.   Mouth/Throat: Uvula is midline, oropharynx is clear and moist and mucous membranes are normal. No trismus in the jaw. Normal dentition. No uvula swelling. No oropharyngeal exudate, posterior oropharyngeal edema or posterior oropharyngeal erythema.      Comments:   Positive dental decay to left lower molar and premolar teeth.    Positive gum redness and swelling around left lower premolar tooth.  Positive mild left facial swelling.      Eyes: Conjunctivae and lids are normal. No scleral icterus.   Neck: Trachea normal and phonation normal. Neck supple. No edema present. No erythema present. No neck rigidity present.   Cardiovascular: Normal rate, regular rhythm, normal heart sounds and normal pulses.   Pulmonary/Chest: Effort normal and breath sounds normal. No respiratory distress. He has no decreased breath sounds. He has no rhonchi.   Abdominal: Normal appearance.   Musculoskeletal: Normal range of motion.         General: No deformity. Normal range of motion.   Neurological: He is alert and oriented to person, place, and time. He exhibits normal muscle tone. Coordination normal.   Skin: Skin is warm, dry, intact, not diaphoretic and not pale.   Psychiatric: His speech is normal and behavior is normal. Judgment and thought content normal.   Nursing note and vitals reviewed.      Assessment:     1. Tooth infection    2. Toothache    3. Ear pain, referred, left        Plan:   Discussed exam findings/results/diagnosis/plan with patient. Advised to f/u with PCP within 2-5 days. ER precautions given if symptoms get any worse. All questions answered. Patient verbally understood and agreed with treatment plan.  Educational materials and instructions regarding the visit diagnosis and management provided.     Tooth infection  -     Ambulatory referral/consult to Dentistry    Toothache    Ear pain, referred, left    Other orders  -     clindamycin (CLEOCIN) 300 MG capsule;  Take 1 capsule (300 mg total) by mouth 4 (four) times daily. for 7 days  Dispense: 28 capsule; Refill: 0

## 2024-05-29 NOTE — PROGRESS NOTES
Individual Psychotherapy (PhD/LCSW)    5/29/2024    Site:  Telemed         Location: [place]     Therapeutic Intervention: Met with patient.  Outpatient - Insight oriented psychotherapy 60 min - CPT code 61112, Outpatient - Behavior modifying psychotherapy 60 min - CPT code 54487, and Outpatient - Supportive psychotherapy 60 min - CPT Code 17184    Chief complaint/reason for encounter: depression, cognition, somatic, and interpersonal     Interval history and content of current session:   LCSW began by asking follow up in regards to BEPB program referral for patient.  Patient reports he has discussed the program with a staff person and has an intake pending.   He does report that he is not sure if this program will be beneficial to him.  His wife has enjoyed group therapy but he has concerns.  He wants to make sure that the program can address mental health but also his cognitive issues.  They did discuss CBT and homework and he is concerned about homework related to anxiety.  He reports it is not specifically looking to work on anxiety.     LCSW did bring up that confronting anxiety will help with triggers related to FND.  LCSW did encourage patient to attend the intake.  LCSW noted the patient does not have to attend this voluntary group.     Patient reported ongoing feelings of depression related to next steps in his life.  His wife may stay a new job on the [facility] and he finds this distressing.  He is going to consider talking to grandparents to stay with them in [place].  LCSW noted this is a good idea and to discuss with his wife once she completes her group therapy.     He will continue to work on completing tasks and identifying things that are fulfilling.  He will consider going [place] [facility] although he notes the scenary will not be the berst.  LCSW asked patient to manage expectations and to schedule the visit in a few weeks and months.  This short getaway can help with depressive /  negative thoughts.    Treatment plan:  Target symptoms: recurrent depression, adjustment  Why chosen therapy is appropriate versus another modality: relevant to diagnosis  Outcome monitoring methods: self-report, observation  Therapeutic intervention type: insight oriented psychotherapy, behavior modifying psychotherapy, supportive psychotherapy    Risk parameters:  Patient reports no suicidal ideation  Patient reports no homicidal ideation  Patient reports no self-injurious behavior  Patient reports no violent behavior    Verbal deficits: None    Patient's response to intervention:  The patient's response to intervention is guarded.    Progress toward goals and other mental status changes:  The patient's progress toward goals is limited.    Diagnosis:     ICD-10-CM ICD-9-CM   1. 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 Depression, unspecified depression type  F32.A 311       Plan:  individual psychotherapy    Return to clinic: 2 weeks    Length of Service (minutes):  75

## 2024-05-30 PROBLEM — F32.A DEPRESSION: Status: ACTIVE | Noted: 2024-05-30

## 2024-06-05 ENCOUNTER — OFFICE VISIT (OUTPATIENT)
Dept: PSYCHIATRY | Facility: CLINIC | Age: 49
End: 2024-06-05
Payer: COMMERCIAL

## 2024-06-05 DIAGNOSIS — F44.5 FUNCTIONAL NEUROLOGICAL SYMPTOM DISORDER WITH ATTACKS OR SEIZURES: ICD-10-CM

## 2024-06-05 DIAGNOSIS — F32.89 OTHER DEPRESSION: ICD-10-CM

## 2024-06-05 DIAGNOSIS — F41.9 ANXIETY: Primary | ICD-10-CM

## 2024-06-05 PROCEDURE — 99999 PR PBB SHADOW E&M-EST. PATIENT-LVL I: CPT | Mod: PBBFAC,,, | Performed by: PSYCHOLOGIST

## 2024-06-05 PROCEDURE — 90791 PSYCH DIAGNOSTIC EVALUATION: CPT | Mod: S$GLB,,, | Performed by: PSYCHOLOGIST
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
## 2024-06-10 ENCOUNTER — PATIENT MESSAGE (OUTPATIENT)
Dept: PSYCHIATRY | Facility: CLINIC | Age: 49
End: 2024-06-10
Payer: COMMERCIAL

## 2024-06-10 ENCOUNTER — OFFICE VISIT (OUTPATIENT)
Dept: NEUROLOGY | Facility: CLINIC | Age: 49
End: 2024-06-10
Payer: COMMERCIAL

## 2024-06-10 DIAGNOSIS — G40.209 COMPLEX PARTIAL SEIZURES EVOLVING TO GENERALIZED TONIC-CLONIC SEIZURES: Primary | ICD-10-CM

## 2024-06-10 DIAGNOSIS — F44.5 FUNCTIONAL NEUROLOGICAL SYMPTOM DISORDER WITH ATTACKS OR SEIZURES: ICD-10-CM

## 2024-06-10 PROCEDURE — 90837 PSYTX W PT 60 MINUTES: CPT | Mod: 95,,, | Performed by: SOCIAL WORKER

## 2024-06-11 NOTE — PROGRESS NOTES
"Individual Psychotherapy (PhD/LCSW)    6/10/2024    Site:  Telemed         Location: [place]     Therapeutic Intervention: Met with patient.  Outpatient - Insight oriented psychotherapy 60 min - CPT code 04976, Outpatient - Behavior modifying psychotherapy 60 min - CPT code 24337, and Outpatient - Supportive psychotherapy 60 min - CPT Code 75425    Chief complaint/reason for encounter: depression, anxiety, and somatic     Interval history and content of current session:   Patient began session by providing an update to LCSW regarding moving.  Patient previously sent LCSW a message stating patient was excited to learn they would be moving to a larger home.  He notes they will have a garage and a larger kitchen.  He reports this will give him opportunities to work on art and other projects he has not been able to do.     He presented with a marked decrease in depression and anxiety overall.   HE reports he did an intake interview regarding group therapy and that it went well.  His wife is currently attending these groups and she had good things to say.  He also notes his wife has returned to work.     He presents with increased insight into negative thinking and even counteracted "catastrophizing" as it relates to living in this area.  He reports he is accepting it , and living his life and identifying ways to cope.  LCSW noted this is how you utilize CBT and it works.     He reports some symptoms that he thinks is related to FND and not actual epileptic seizures.   He will continue to work on identifying triggers to these. He will consider driving soon as it has been over 6 months since his last seizures.  He still maintains boundaries with his landlord if the landlord asks for help with projects requiring a ladder.  He tells him no and LCSW supports this boundary.     Treatment plan:  Target symptoms: depression, adjustment, work stress  Why chosen therapy is appropriate versus another modality: relevant to diagnosis, " patient responds to this modality, evidence based practice  Outcome monitoring methods: self-report, observation  Therapeutic intervention type: insight oriented psychotherapy, behavior modifying psychotherapy, supportive psychotherapy    Risk parameters:  Patient reports no suicidal ideation  Patient reports no homicidal ideation  Patient reports no self-injurious behavior  Patient reports no violent behavior    Verbal deficits: None    Patient's response to intervention:  The patient's response to intervention is accepting.    Progress toward goals and other mental status changes:  The patient's progress toward goals is good.    Diagnosis:     ICD-10-CM ICD-9-CM   1. Complex partial seizures evolving to generalized tonic-clonic seizures  G40.209 345.40   2. 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

## 2024-06-20 DIAGNOSIS — G40.209 COMPLEX PARTIAL SEIZURES EVOLVING TO GENERALIZED TONIC-CLONIC SEIZURES: ICD-10-CM

## 2024-06-21 RX ORDER — ZONISAMIDE 100 MG/1
200 CAPSULE ORAL NIGHTLY
Qty: 180 CAPSULE | Refills: 3 | Status: SHIPPED | OUTPATIENT
Start: 2024-06-21 | End: 2025-06-22

## 2024-06-24 ENCOUNTER — OFFICE VISIT (OUTPATIENT)
Dept: NEUROLOGY | Facility: CLINIC | Age: 49
End: 2024-06-24
Payer: COMMERCIAL

## 2024-06-24 DIAGNOSIS — F44.5 FUNCTIONAL NEUROLOGICAL SYMPTOM DISORDER WITH ATTACKS OR SEIZURES: ICD-10-CM

## 2024-06-24 DIAGNOSIS — F32.A DEPRESSION, UNSPECIFIED DEPRESSION TYPE: ICD-10-CM

## 2024-06-24 DIAGNOSIS — F41.9 ANXIETY: ICD-10-CM

## 2024-06-24 DIAGNOSIS — G40.209 COMPLEX PARTIAL SEIZURES EVOLVING TO GENERALIZED TONIC-CLONIC SEIZURES: Primary | ICD-10-CM

## 2024-06-24 PROCEDURE — 90837 PSYTX W PT 60 MINUTES: CPT | Mod: 95,,, | Performed by: SOCIAL WORKER

## 2024-06-24 NOTE — PROGRESS NOTES
Individual Psychotherapy (PhD/LCSW)    6/24/2024    Site:  Telemed         Location: [place]     Therapeutic Intervention: Met with patient.  Outpatient - Insight oriented psychotherapy 60 min - CPT code 10056, Outpatient - Behavior modifying psychotherapy 60 min - CPT code 69598, and Outpatient - Supportive psychotherapy 60 min - CPT Code 52281    Chief complaint/reason for encounter: depression, mood swings, anxiety, somatic, and interpersonal     Interval history and content of current session:   Going to start groups with Dr. Castillo in about a week. Will only go on Tuesdays.   Managing expectations in regards to groups.  He is going to give it a try.  LCSW highlighted that this is improvement in regards to overall depression and anger and patient agreed.     Patient brought up the fact that he ws helping him and his wife move to their new place.  He is grateful for a garage and a larger kitchen.  He also identified improvement in overall self esteem in regards to moving.  He has been driving some and he was able to navigate a car with a trailer attached.  He wonders why this made him feel good about himself.  He notes it is akin to muscle memory.  LCSW noted that task completion helps with decreasing depression.     He continues to be mindful of physical stress whether tasks or the heat.     Treatment plan:  Target symptoms: depression, lack of focus, anxiety , adjustment, work stress  Why chosen therapy is appropriate versus another modality: relevant to diagnosis, patient responds to this modality, evidence based practice  Outcome monitoring methods: self-report, observation  Therapeutic intervention type: insight oriented psychotherapy, behavior modifying psychotherapy, supportive psychotherapy    Risk parameters:  Patient reports no suicidal ideation  Patient reports no homicidal ideation  Patient reports no self-injurious behavior  Patient reports no violent behavior    Verbal deficits: None    Patient's response  to intervention:  The patient's response to intervention is accepting.    Progress toward goals and other mental status changes:  The patient's progress toward goals is good.    Diagnosis:     ICD-10-CM ICD-9-CM   1. Complex partial seizures evolving to generalized tonic-clonic seizures  G40.209 345.40   2. 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 Anxiety  F41.9 300.00   4. Depression, unspecified depression type  F32.A 311       Plan:  individual psychotherapy    Return to clinic: 2 weeks    Length of Service (minutes): 60

## 2024-07-01 ENCOUNTER — PATIENT MESSAGE (OUTPATIENT)
Dept: NEUROLOGY | Facility: CLINIC | Age: 49
End: 2024-07-01
Payer: COMMERCIAL

## 2024-07-08 ENCOUNTER — OFFICE VISIT (OUTPATIENT)
Dept: NEUROLOGY | Facility: CLINIC | Age: 49
End: 2024-07-08
Payer: COMMERCIAL

## 2024-07-08 DIAGNOSIS — G40.209 COMPLEX PARTIAL SEIZURES EVOLVING TO GENERALIZED TONIC-CLONIC SEIZURES: Primary | ICD-10-CM

## 2024-07-08 DIAGNOSIS — F44.5 FUNCTIONAL NEUROLOGICAL SYMPTOM DISORDER WITH ATTACKS OR SEIZURES: ICD-10-CM

## 2024-07-08 PROCEDURE — 90837 PSYTX W PT 60 MINUTES: CPT | Mod: 95,,, | Performed by: SOCIAL WORKER

## 2024-07-08 NOTE — PROGRESS NOTES
"Individual Psychotherapy (PhD/LCSW)    7/8/2024    Site:  Telemed         Location: [place]     Therapeutic Intervention: Met with patient.  Outpatient - Insight oriented psychotherapy 60 min - CPT code 75518, Outpatient - Behavior modifying psychotherapy 60 min - CPT code 39650, and Outpatient - Supportive psychotherapy 60 min - CPT Code 02172    Chief complaint/reason for encounter: depression, anxiety, somatic, and interpersonal     Interval history and content of current session:   Patient began session by following up on his recent move to a larger home. He also reports that he has family nearby and notes that having family support is very reassuring.  He relates this to potential neurological issues and needing support.     He has anxiety related to "work" tasks he needs to simalr to previous landlord.   He is giving himself permission to have time off and work on art projects. LCSW noted that allowing permission is a powerful tool that many clients find helpful.     He is working on organizing work sapce and art space and keeping things separate.     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    His wife is getting used to her commute and LCSW recommended task completion while in traffic that can include: podcasts, music, mindfulness.     He reports he did go to dinner with some of his wife's co workers and he found this beneficial since it made him feel he was around adults again.     He continues to report a decrease in depression , anxiety and functional events.     Treatment plan:  Target symptoms: depression, anxiety , adjustment  Why chosen therapy is appropriate versus another modality: relevant to diagnosis, patient responds to this modality, evidence based practice  Outcome monitoring methods: self-report, observation  Therapeutic " intervention type: insight oriented psychotherapy, behavior modifying psychotherapy, supportive psychotherapy    Risk parameters:  Patient reports no suicidal ideation  Patient reports no homicidal ideation  Patient reports no self-injurious behavior  Patient reports no violent behavior    Verbal deficits: None    Patient's response to intervention:  The patient's response to intervention is accepting, motivated.    Progress toward goals and other mental status changes:  The patient's progress toward goals is excellent.    Diagnosis:     ICD-10-CM ICD-9-CM   1. Complex partial seizures evolving to generalized tonic-clonic seizures  G40.209 345.40   2. 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

## 2024-07-15 NOTE — PROGRESS NOTES
[facility]  Individual Psychotherapy (PhD/LCSW)    7/16/2024    Site:  [facility]         Therapeutic Intervention: Met with patient.  Outpatient - Insight oriented psychotherapy 60 min - CPT code 60150 and Outpatient - Behavior modifying psychotherapy 60 min - CPT code 42489    Chief complaint/reason for encounter: depression and anxiety     Interval history and content of current session: Mr. Pal Mccrary arrived on time for his scheduled appointment.    [facility], Session 1 (Treatment Initiation)  Session Focus:  Brief check-in  Set agenda  Collect rating scales   LAZARUS-7 Score: (P) 3  Interpretation: (P) Normal   PHQ8 Score : (P) 9  PHQ8 Interpretation: (P) Mild  Introduction to Therapy and CBT  Treatment Plan/Goals  Review Values and Aspirations  Address patient concerns  Summarize session  Feedback about session    Action Plan:  Review therapy materials  Intervention practice:   Use Cognitive-Behavioral Model Diagram for 3 situations  Complete the Bridging Sessions worksheet for Session 2    07/16/2024   Overall Treatment Plan: I would like to have a sense of self and figure out my new normal. I want a better sense of the big-picture things in my life.  Values and Aspirations: Family relationships, Marriage, Moral principles   Problem List / Patient's Goals and Evidence-Based Interventions (include up to 5):   Problem #1: Difficulty thinking clearly and remembering things clearly  Goal: I would like to be able to have better recall and think more clearly. I would like to articulate myself better.  Therapy Interventions: ___  Non-Therapy Strategies: ___   Problem #2: Lack of ambition, focus, drive, motivation, self-confidence  Goal: I would like to have better follow-through, especially with creative endeavors. I would like to feel more confident and productive.  Therapy Interventions: ___  Non-Therapy Strategies: ___   Problem #3: Difficulty adjusting to the plateau I'm experiencing with neurological  improvements and diagnosis of FND (PNES/PNEE)  Goal: I would like to come to  with my condition so I can be content in general and move forward  Therapy Interventions: ___  Non-Therapy Strategies: ___   Problem #4:  Goal: ___  Therapy Interventions: ___  Non-Therapy Strategies: ___   Problem #5:  Goal: ___  Therapy Interventions: ___  Non-Therapy Strategies: ___       Treatment plan:  Target symptoms: depression, anxiety   Why chosen therapy is appropriate versus another modality: relevant to diagnosis, evidence based practice  Outcome monitoring methods: self-report, checklist/rating scale  Therapeutic intervention type: insight oriented psychotherapy, behavior modifying psychotherapy    Risk parameters:  Patient reports no suicidal ideation  Patient reports no homicidal ideation  Patient reports no self-injurious behavior  Patient reports no violent behavior    Verbal deficits: None    Patient's response to intervention:  The patient's response to intervention is accepting.    Progress toward goals and other mental status changes:  The patient's progress toward goals is fair .    Diagnosis:     ICD-10-CM ICD-9-CM   1. Anxiety  F41.9 300.00   2. Other depression  F32.89 311   3. 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

## 2024-07-16 ENCOUNTER — OFFICE VISIT (OUTPATIENT)
Dept: PSYCHIATRY | Facility: CLINIC | Age: 49
End: 2024-07-16
Payer: COMMERCIAL

## 2024-07-16 DIAGNOSIS — F44.5 FUNCTIONAL NEUROLOGICAL SYMPTOM DISORDER WITH ATTACKS OR SEIZURES: ICD-10-CM

## 2024-07-16 DIAGNOSIS — F41.9 ANXIETY: Primary | ICD-10-CM

## 2024-07-16 DIAGNOSIS — F32.89 OTHER DEPRESSION: ICD-10-CM

## 2024-07-16 PROCEDURE — 90837 PSYTX W PT 60 MINUTES: CPT | Mod: S$GLB,,, | Performed by: PSYCHOLOGIST

## 2024-07-23 ENCOUNTER — OFFICE VISIT (OUTPATIENT)
Dept: PSYCHIATRY | Facility: CLINIC | Age: 49
End: 2024-07-23
Payer: COMMERCIAL

## 2024-07-23 DIAGNOSIS — F32.89 OTHER DEPRESSION: Primary | ICD-10-CM

## 2024-07-23 DIAGNOSIS — F44.5 FUNCTIONAL NEUROLOGICAL SYMPTOM DISORDER WITH ATTACKS OR SEIZURES: ICD-10-CM

## 2024-07-23 DIAGNOSIS — F41.9 ANXIETY: ICD-10-CM

## 2024-07-23 PROCEDURE — 90837 PSYTX W PT 60 MINUTES: CPT | Mod: S$GLB,,, | Performed by: PSYCHOLOGIST

## 2024-07-23 NOTE — PROGRESS NOTES
[facility]  Individual Psychotherapy (PhD/LCSW)    7/23/2024    Site:  [facility]         Therapeutic Intervention: Met with patient.  Outpatient - Insight oriented psychotherapy 60 min - CPT code 79780 and Outpatient - Behavior modifying psychotherapy 60 min - CPT code 82145    Chief complaint/reason for encounter: depression and anxiety     Interval history and content of current session: Mr. Pal Mccrary arrived on time for his scheduled appointment.    [facility], Session 2 (Behavioral Activation) - #5 in manual  Session Focus:  Brief check-in  Set agenda  Collect rating scales   PHQ8 Score : (P) 14  PHQ8 Interpretation: (P) Moderate  LAZARUS-7 Score: (P) 5  Interpretation: (P) Mild Anxiety   Review action plan  Review Treatment Plan and Values/Aspirations as needed  B in CBT, Behavioral Activation, Identifying Activities  Intervention techniques: Behavioral Activation  Summarize session  Feedback about session    Action Plan:  Review therapy materials  Intervention practice:   Complete the Activity Monitoring and Mood Rating Chart    07/16/2024   Overall Treatment Plan: I would like to have a sense of self and figure out my new normal. I want a better sense of the big-picture things in my life.  Values and Aspirations: Family relationships, Marriage, Moral principles   Problem List / Patient's Goals and Evidence-Based Interventions (include up to 5):   Problem #1: Difficulty thinking clearly and remembering things clearly  Goal: I would like to be able to have better recall and think more clearly. I would like to articulate myself better.  Therapy Interventions: ___  Non-Therapy Strategies: ___   Problem #2: Lack of ambition, focus, drive, motivation, self-confidence  Goal: I would like to have better follow-through, especially with creative endeavors. I would like to feel more confident and productive.  Therapy Interventions: ___  Non-Therapy Strategies: ___   Problem #3: Difficulty adjusting to the  plateau I'm experiencing with neurological improvements and diagnosis of FND (PNES/PNEE), Difficulty accepting life in [place]  Goal: I would like to come to  with my condition so I can be content in general and move forward (acceptance)  Therapy Interventions: ___  Non-Therapy Strategies: ___   Problem #4: _____  Goal: ___  Therapy Interventions: ___  Non-Therapy Strategies: ___   Problem #5:  Goal: ___  Therapy Interventions: ___  Non-Therapy Strategies: ___       Treatment plan:  Target symptoms: depression, anxiety   Why chosen therapy is appropriate versus another modality: relevant to diagnosis, evidence based practice  Outcome monitoring methods: self-report, checklist/rating scale  Therapeutic intervention type: insight oriented psychotherapy, behavior modifying psychotherapy    Risk parameters:  Patient reports no suicidal ideation  Patient reports no homicidal ideation  Patient reports no self-injurious behavior  Patient reports no violent behavior    Verbal deficits: None    Patient's response to intervention:  The patient's response to intervention is accepting.    Progress toward goals and other mental status changes:  The patient's progress toward goals is fair .    Diagnosis:     ICD-10-CM ICD-9-CM   1. Other depression  F32.89 311   2. Anxiety  F41.9 300.00   3. 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

## 2024-07-24 ENCOUNTER — PATIENT MESSAGE (OUTPATIENT)
Dept: NEUROLOGY | Facility: CLINIC | Age: 49
End: 2024-07-24
Payer: COMMERCIAL

## 2024-07-25 ENCOUNTER — TELEPHONE (OUTPATIENT)
Dept: PSYCHIATRY | Facility: CLINIC | Age: 49
End: 2024-07-25
Payer: COMMERCIAL

## 2024-07-25 NOTE — TELEPHONE ENCOUNTER
Returned phone call to pt regarding [facility] F/U appointments. Pt to discontinue BEBP tx as it is not a good fit for his neurological condition. Writer canceled remaining [facility] sessions and advised pt to discuss further tx plan with [facility] providers. Pt agreed.

## 2024-07-29 ENCOUNTER — OFFICE VISIT (OUTPATIENT)
Dept: NEUROLOGY | Facility: CLINIC | Age: 49
End: 2024-07-29
Payer: COMMERCIAL

## 2024-07-29 ENCOUNTER — PATIENT MESSAGE (OUTPATIENT)
Dept: NEUROLOGY | Facility: CLINIC | Age: 49
End: 2024-07-29
Payer: COMMERCIAL

## 2024-07-29 DIAGNOSIS — G40.909 SEIZURE DISORDER: Primary | ICD-10-CM

## 2024-07-29 DIAGNOSIS — F44.5 FUNCTIONAL NEUROLOGICAL SYMPTOM DISORDER WITH ATTACKS OR SEIZURES: ICD-10-CM

## 2024-07-29 PROCEDURE — 90837 PSYTX W PT 60 MINUTES: CPT | Mod: 95,,, | Performed by: SOCIAL WORKER

## 2024-07-29 NOTE — PROGRESS NOTES
Individual Psychotherapy (PhD/ANN)    7/29/2024    Site:  Telemed         Location: [place]     Therapeutic Intervention: Met with patient.  Outpatient - Insight oriented psychotherapy 60 min - CPT code 51372, Outpatient - Behavior modifying psychotherapy 60 min - CPT code 62955, and Outpatient - Supportive psychotherapy 60 min - CPT Code 87877    Chief complaint/reason for encounter: anger, anxiety, somatic, and interpersonal     Interval history and content of current session:   STEPHANIEW asked patient to begin session by recounting experience with the [facility] program.  Patient had reached out and reported he was distressed related to task completion / homework assignments from the program.  He reports he feels that the staff should have more of an understanding of the brain, PTSD and functional neurological disorder.  He agrees it was not a good fit for him.  STEPHANIEW agreed with the assessment and noted it may be more suited for people with major depression, or people as a step down from inpatient stays.     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   STEPHANIEW encouraged patient to follow through.     He admits to ruminating negative thoughts and told himself to distract self.  ANN did acknowledge that ruminating can make a person feel overstimulated.  He reports he tries to work on other home projects and tasks.  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     ANN did note that the neurology department just began having discussion related to supporting FND and FMD and seeing what this support would look like in the future. Ideas range from dedicated staff to dedicated groups. He is thankful for ANN, his neurologist and his neuropsychologist for their support.     Treatment plan:  Target symptoms: depression, distractability, adjustment  Why  chosen therapy is appropriate versus another modality: relevant to diagnosis  Outcome monitoring methods: self-report, observation  Therapeutic intervention type: insight oriented psychotherapy, behavior modifying psychotherapy, supportive psychotherapy    Risk parameters:  Patient reports no suicidal ideation  Patient reports no homicidal ideation  Patient reports no self-injurious behavior  Patient reports no violent behavior    Verbal deficits: None    Patient's response to intervention:  The patient's response to intervention is guarded.    Progress toward goals and other mental status changes:  The patient's progress toward goals is fair .    Diagnosis:     ICD-10-CM ICD-9-CM   1. Seizure disorder  G40.909 345.90   2. 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

## 2024-08-02 ENCOUNTER — PATIENT MESSAGE (OUTPATIENT)
Dept: NEUROLOGY | Facility: CLINIC | Age: 49
End: 2024-08-02
Payer: COMMERCIAL

## 2024-08-03 ENCOUNTER — PATIENT MESSAGE (OUTPATIENT)
Dept: NEUROLOGY | Facility: CLINIC | Age: 49
End: 2024-08-03
Payer: COMMERCIAL

## 2024-08-08 ENCOUNTER — PATIENT MESSAGE (OUTPATIENT)
Dept: NEUROLOGY | Facility: CLINIC | Age: 49
End: 2024-08-08
Payer: COMMERCIAL

## 2024-08-09 ENCOUNTER — PATIENT MESSAGE (OUTPATIENT)
Dept: NEUROLOGY | Facility: CLINIC | Age: 49
End: 2024-08-09

## 2024-08-09 ENCOUNTER — OFFICE VISIT (OUTPATIENT)
Dept: NEUROLOGY | Facility: CLINIC | Age: 49
End: 2024-08-09
Payer: COMMERCIAL

## 2024-08-09 DIAGNOSIS — F41.9 ANXIETY: ICD-10-CM

## 2024-08-09 DIAGNOSIS — Z65.8 PSYCHOSOCIAL STRESSORS: ICD-10-CM

## 2024-08-09 DIAGNOSIS — F44.5 FUNCTIONAL NEUROLOGICAL SYMPTOM DISORDER WITH ATTACKS OR SEIZURES: ICD-10-CM

## 2024-08-09 DIAGNOSIS — G40.909 SEIZURE DISORDER: Primary | ICD-10-CM

## 2024-08-09 RX ORDER — CLONAZEPAM 1 MG/1
TABLET ORAL
Qty: 30 TABLET | Refills: 1 | Status: SHIPPED | OUTPATIENT
Start: 2024-08-09

## 2024-08-09 RX ORDER — ZONISAMIDE 100 MG/1
200 CAPSULE ORAL NIGHTLY
Qty: 180 CAPSULE | Refills: 3 | Status: SHIPPED | OUTPATIENT
Start: 2024-08-09 | End: 2025-08-09

## 2024-08-09 RX ORDER — LAMOTRIGINE 250 MG/1
250 TABLET, EXTENDED RELEASE ORAL NIGHTLY
Qty: 90 TABLET | Refills: 3 | Status: SHIPPED | OUTPATIENT
Start: 2024-08-09 | End: 2025-08-04

## 2024-08-12 ENCOUNTER — OFFICE VISIT (OUTPATIENT)
Dept: NEUROLOGY | Facility: CLINIC | Age: 49
End: 2024-08-12
Payer: COMMERCIAL

## 2024-08-12 DIAGNOSIS — F43.23 ADJUSTMENT DISORDER WITH MIXED ANXIETY AND DEPRESSED MOOD: ICD-10-CM

## 2024-08-12 DIAGNOSIS — Z63.5 DIVORCE PROCEEDINGS PENDING: ICD-10-CM

## 2024-08-12 DIAGNOSIS — F44.5 FUNCTIONAL NEUROLOGICAL SYMPTOM DISORDER WITH ATTACKS OR SEIZURES: ICD-10-CM

## 2024-08-12 DIAGNOSIS — G40.209 COMPLEX PARTIAL SEIZURES EVOLVING TO GENERALIZED TONIC-CLONIC SEIZURES: Primary | ICD-10-CM

## 2024-08-12 PROCEDURE — 90837 PSYTX W PT 60 MINUTES: CPT | Mod: 95,,, | Performed by: SOCIAL WORKER

## 2024-08-12 SDOH — SOCIAL DETERMINANTS OF HEALTH (SDOH): DISRUPTION OF FAMILY BY SEPARATION AND DIVORCE: Z63.5

## 2024-08-12 NOTE — PROGRESS NOTES
"Individual Psychotherapy (PhD/LCSW)    8/12/2024    Site:  Telemed     Location: [place]     Therapeutic Intervention: Met with patient.  Outpatient - Insight oriented psychotherapy 60 min - CPT code 36659, Outpatient - Behavior modifying psychotherapy 60 min - CPT code 35151, and Outpatient - Supportive psychotherapy 60 min - CPT Code 31986    Chief complaint/reason for encounter: depression, anger, somatic, and interpersonal     Interval history and content of current session:   Patient began session by providing update in regards to his wife requesting a divorce. He notes that she packed up some things and stayed with her parents.  She came back for more things and requested that he move out within the next few weeks and consider moving back to [place].  He agreed and will be moving this week.     LCSW expressed empathy and support at this seemingly quick and abrupt change in relationship.  Acknowledged that patient and wife recently moved and settled into a new place and that they seemed to be adjusting.  Patient reports his wife was telling him that he is the cause of her anxiety and that he does not do enough in regards to providing financial support.  He reports he has helped with bills and he reports that she needs to take accountability for her emotions.  LCSW did agree that she should continue work with therapy ( indv. Or group ) and work on her anxiety.     Patient reports that although he loves his wife , he does not feel "devastated."  He acknowledges hx of previous divorce and he does not want to allow himself to be that depressed again. LCSW also noted that the patient had been wanting to return to [place] and that he was not sure when that would be.  LCSW noted that now there is an answer to this and he gets to return.  He acknowledged supportive friends and family and they have reached out already.     LCSW did acknowledge patient's progress in the past few weeks in regards to physical " symptoms and emotional symptoms.  LCSW noted patient's work with seizures, FND, indv. Therapy, group therapy.  Patient agreed and noted he is grateful for  medical team and LCSW.      LCSW offered follow up chao call since patient is moving out of state and patient has to be located in [place] for virtual visits.  Patient agreed.     Treatment plan:  Target symptoms: recurrent depression, adjustment, grief  Why chosen therapy is appropriate versus another modality: relevant to diagnosis, patient responds to this modality, evidence based practice  Outcome monitoring methods: self-report, observation  Therapeutic intervention type: insight oriented psychotherapy, behavior modifying psychotherapy, supportive psychotherapy    Risk parameters:  Patient reports no suicidal ideation  Patient reports no homicidal ideation  Patient reports no self-injurious behavior  Patient reports no violent behavior    Verbal deficits: None    Patient's response to intervention:  The patient's response to intervention is accepting, motivated.    Progress toward goals and other mental status changes:  The patient's progress toward goals is good.    Diagnosis:     ICD-10-CM ICD-9-CM   1. Complex partial seizures evolving to generalized tonic-clonic seizures  G40.209 345.40   2. 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 Adjustment disorder with mixed anxiety and depressed mood  F43.23 309.28   4. Divorce proceedings pending  Z63.5 V61.03       Plan:  individual psychotherapy    Return to clinic: as needed    Length of Service (minutes): 60

## 2024-08-13 PROBLEM — Z63.5 DIVORCE PROCEEDINGS PENDING: Status: ACTIVE | Noted: 2024-08-13

## 2024-08-13 PROBLEM — F43.23 ADJUSTMENT DISORDER WITH MIXED ANXIETY AND DEPRESSED MOOD: Status: ACTIVE | Noted: 2024-08-13

## 2024-08-26 ENCOUNTER — SOCIAL WORK (OUTPATIENT)
Dept: NEUROLOGY | Facility: CLINIC | Age: 49
End: 2024-08-26
Payer: COMMERCIAL

## 2024-08-26 NOTE — PROGRESS NOTES
"[facility] placed call to patient and verbally spoke to him.[phone]     He notes it's been a week and a half since he moved back to [place].  He is working on acceptance but has way too much time on his hands.  He only sits around and thinks, but does not have many distractions.     He reports some people are sympathetic but has not had a chance to re- connect with others.     He feels by himself, wanted to work things out.  Being alone was never part of the equation. Being  meant to him "never giving up" especially this stage in life.   He notes they had only been  for 6 years.    [place]  [place] did get a walk earlier this morning.  Last night he also walked at night for over a mile.     He is sorting things in the garage but that makes him think and sometimes is sad.     He is looking forward to planning his future and working on acceptance.     "

## 2024-08-30 ENCOUNTER — PATIENT MESSAGE (OUTPATIENT)
Dept: NEUROLOGY | Facility: CLINIC | Age: 49
End: 2024-08-30
Payer: COMMERCIAL

## 2024-09-03 DIAGNOSIS — F44.5 FUNCTIONAL NEUROLOGICAL SYMPTOM DISORDER WITH ATTACKS OR SEIZURES: ICD-10-CM

## 2024-09-03 DIAGNOSIS — G40.209 COMPLEX PARTIAL SEIZURES EVOLVING TO GENERALIZED TONIC-CLONIC SEIZURES: Primary | ICD-10-CM
